# Patient Record
Sex: FEMALE | Race: OTHER | HISPANIC OR LATINO
[De-identification: names, ages, dates, MRNs, and addresses within clinical notes are randomized per-mention and may not be internally consistent; named-entity substitution may affect disease eponyms.]

---

## 2019-05-22 PROBLEM — Z00.00 ENCOUNTER FOR PREVENTIVE HEALTH EXAMINATION: Status: ACTIVE | Noted: 2019-05-22

## 2019-05-30 ENCOUNTER — APPOINTMENT (OUTPATIENT)
Dept: SPINE | Facility: CLINIC | Age: 59
End: 2019-05-30
Payer: COMMERCIAL

## 2019-05-30 VITALS
BODY MASS INDEX: 29.88 KG/M2 | HEIGHT: 64 IN | DIASTOLIC BLOOD PRESSURE: 76 MMHG | WEIGHT: 175 LBS | SYSTOLIC BLOOD PRESSURE: 120 MMHG

## 2019-05-30 PROCEDURE — 99203 OFFICE O/P NEW LOW 30 MIN: CPT

## 2019-05-30 NOTE — HISTORY OF PRESENT ILLNESS
[> 3 months] : more  than 3 months [FreeTextEntry1] : pain and numbness in the legs when standing or walking [de-identified] : This 58-year-old female who has been describing 10 months of pain and numbness in her legs and feet when she stands or walks. She is pain free when sitting or laying down. She can barely even walk one block before the pain starts. She has not seen any response to various medications. She does not smoke. MRI of the lumbar spine shows severe lumbar spinal stenosis at L3-4 and L4-5.

## 2019-05-30 NOTE — ASSESSMENT
[FreeTextEntry1] : Lumbar spinal claudication secondary to severe stenosis at L3-4 and L4-5. I've discussed further options with her specifically have discussed at L3-4 and L4-5 decompressive laminectomy and in situ fusion (no instrumentation) including all relative risks, benefits and alternatives. A CAT scan will be arranged prior to the surgery.

## 2019-05-30 NOTE — REVIEW OF SYSTEMS
[Feeling Tired] : feeling tired [Leg Weakness] : leg weakness [Anxiety] : anxiety [As Noted in HPI] : as noted in HPI [Eyesight Problems] : eyesight problems [Arthralgias] : arthralgias [Joint Pain] : joint pain [Negative] : Heme/Lymph [FreeTextEntry3] : blurry vision and intolerance to light [FreeTextEntry9] : weakness

## 2019-05-30 NOTE — PHYSICAL EXAM
[Person] : oriented to person [Place] : oriented to place [Time] : oriented to time [Short Term Intact] : short term memory intact [Remote Intact] : remote memory intact [Span Intact] : the attention span was normal [Concentration Intact] : normal concentrating ability [Fluency] : fluency intact [Comprehension] : comprehension intact [Current Events] : adequate knowledge of current events [Past History] : adequate knowledge of personal past history [Vocabulary] : adequate range of vocabulary [Cranial Nerves Optic (II)] : visual acuity intact bilaterally,  pupils equal round and reactive to light [Cranial Nerves Oculomotor (III)] : extraocular motion intact [Cranial Nerves Trigeminal (V)] : facial sensation intact symmetrically [Cranial Nerves Facial (VII)] : face symmetrical [Cranial Nerves Vestibulocochlear (VIII)] : hearing was intact bilaterally [Cranial Nerves Glossopharyngeal (IX)] : tongue and palate midline [Cranial Nerves Accessory (XI - Cranial And Spinal)] : head turning and shoulder shrug symmetric [Cranial Nerves Hypoglossal (XII)] : there was no tongue deviation with protrusion [Motor Tone] : muscle tone was normal in all four extremities [Motor Strength] : muscle strength was normal in all four extremities [No Muscle Atrophy] : normal bulk in all four extremities [Sensation Tactile Decrease] : light touch was intact [Abnormal Walk] : normal gait [Balance] : balance was intact [2+] : Ankle jerk left 2+ [No Tenderness to Palpation] : no spine tenderness on palpation [Full Pulse] : the pedal pulses are present [Past-pointing] : there was no past-pointing [Tremor] : no tremor present [Plantar Reflex Right Only] : normal on the right [Plantar Reflex Left Only] : normal on the left [Washburn] : Washburn's sign was not demonstrated [Straight-Leg Raise Test - Left] : straight leg raise of the left leg was negative [Straight-Leg Raise Test - Right] : straight leg raise  of the right leg was negative

## 2019-05-30 NOTE — CONSULT LETTER
[Dear  ___] : Dear  [unfilled], [Consult Letter:] : I had the pleasure of evaluating your patient, [unfilled]. [Please see my note below.] : Please see my note below. [Consult Closing:] : Thank you very much for allowing me to participate in the care of this patient.  If you have any questions, please do not hesitate to contact me. [Sincerely,] : Sincerely, [FreeTextEntry3] : Shashank Tiwari M.D.

## 2019-05-30 NOTE — REASON FOR VISIT
[New Patient Visit] : a new patient visit [Referred By: _________] : Patient was referred by EDWIN [Family Member] : family member [FreeTextEntry1] : The patient is a 58 year old woman who has been experiencing back pain and burning and numbness in both legs for 10 months.

## 2019-06-18 ENCOUNTER — OUTPATIENT (OUTPATIENT)
Dept: OUTPATIENT SERVICES | Facility: HOSPITAL | Age: 59
LOS: 1 days | End: 2019-06-18
Payer: COMMERCIAL

## 2019-06-18 VITALS
HEART RATE: 62 BPM | OXYGEN SATURATION: 99 % | SYSTOLIC BLOOD PRESSURE: 121 MMHG | WEIGHT: 175.05 LBS | DIASTOLIC BLOOD PRESSURE: 82 MMHG | HEIGHT: 64 IN | TEMPERATURE: 98 F | RESPIRATION RATE: 18 BRPM

## 2019-06-18 DIAGNOSIS — M48.061 SPINAL STENOSIS, LUMBAR REGION WITHOUT NEUROGENIC CLAUDICATION: ICD-10-CM

## 2019-06-18 DIAGNOSIS — Z87.42 PERSONAL HISTORY OF OTHER DISEASES OF THE FEMALE GENITAL TRACT: Chronic | ICD-10-CM

## 2019-06-18 DIAGNOSIS — M48.03 SPINAL STENOSIS, CERVICOTHORACIC REGION: ICD-10-CM

## 2019-06-18 DIAGNOSIS — Z29.9 ENCOUNTER FOR PROPHYLACTIC MEASURES, UNSPECIFIED: ICD-10-CM

## 2019-06-18 DIAGNOSIS — Z98.890 OTHER SPECIFIED POSTPROCEDURAL STATES: Chronic | ICD-10-CM

## 2019-06-18 LAB
BLD GP AB SCN SERPL QL: NEGATIVE — SIGNIFICANT CHANGE UP
RH IG SCN BLD-IMP: POSITIVE — SIGNIFICANT CHANGE UP

## 2019-06-18 PROCEDURE — 86850 RBC ANTIBODY SCREEN: CPT

## 2019-06-18 PROCEDURE — 87641 MR-STAPH DNA AMP PROBE: CPT

## 2019-06-18 PROCEDURE — 85027 COMPLETE CBC AUTOMATED: CPT

## 2019-06-18 PROCEDURE — 87640 STAPH A DNA AMP PROBE: CPT

## 2019-06-18 PROCEDURE — 86901 BLOOD TYPING SEROLOGIC RH(D): CPT

## 2019-06-18 PROCEDURE — G0463: CPT

## 2019-06-18 PROCEDURE — 86900 BLOOD TYPING SEROLOGIC ABO: CPT

## 2019-06-18 RX ORDER — CEFAZOLIN SODIUM 1 G
2000 VIAL (EA) INJECTION ONCE
Refills: 0 | Status: DISCONTINUED | OUTPATIENT
Start: 2019-06-25 | End: 2019-06-30

## 2019-06-18 NOTE — H&P PST ADULT - NSICDXPASTMEDICALHX_GEN_ALL_CORE_FT
PAST MEDICAL HISTORY:  PVD (peripheral vascular disease)     Spinal stenosis of lumbar region     Uterine prolapse sx >25 yrs ago

## 2019-06-18 NOTE — H&P PST ADULT - HISTORY OF PRESENT ILLNESS
59 yr old Somali speaking (poor historian) pt presents to [facility] accompanied by her daughter for a L3-5 Decompression Lumbar Laminectomy Insitu Fusion on 6/25/2019. PMH: PVD (as per daughter "had some in-office procedure on her legs by a vascular doctor") and lumbar spinal stenosis. Pt w/ progressive pain from her lumbar spine down her right leg making it difficult to ambulate which is affecting her activity of daily living. Denies chest pain or SOB and feels well otherwise.

## 2019-06-18 NOTE — H&P PST ADULT - NSICDXPASTSURGICALHX_GEN_ALL_CORE_FT
PAST SURGICAL HISTORY:  History of uterine prolapse s/p sx >25 yrs ago    Status post laser ablation of incompetent vein B/L legs 2/19'

## 2019-06-18 NOTE — H&P PST ADULT - NSICDXPROBLEM_GEN_ALL_CORE_FT
PROBLEM DIAGNOSES  Problem: Spinal stenosis of lumbar region  Assessment and Plan: Pt scheduled for sx on 6/25/2019. Surgical and chlorhexidine instructions reviewed w/ pt and daughter.     Problem: Need for prophylactic measure  Assessment and Plan: The Caprini score indicates this patient is at risk for a VTE event (score 3-5).  Most surgical patients in this group would benefit from pharmacologic prophylaxis.  The surgical team will determine the balance between VTE risk and bleeding risk

## 2019-06-19 LAB
HCT VFR BLD CALC: 44.3 % — SIGNIFICANT CHANGE UP (ref 34.5–45)
HGB BLD-MCNC: 14.1 G/DL — SIGNIFICANT CHANGE UP (ref 11.5–15.5)
MCHC RBC-ENTMCNC: 29.3 PG — SIGNIFICANT CHANGE UP (ref 27–34)
MCHC RBC-ENTMCNC: 31.8 GM/DL — LOW (ref 32–36)
MCV RBC AUTO: 92.1 FL — SIGNIFICANT CHANGE UP (ref 80–100)
MRSA PCR RESULT.: SIGNIFICANT CHANGE UP
PLATELET # BLD AUTO: 205 K/UL — SIGNIFICANT CHANGE UP (ref 150–400)
RBC # BLD: 4.81 M/UL — SIGNIFICANT CHANGE UP (ref 3.8–5.2)
RBC # FLD: 13.3 % — SIGNIFICANT CHANGE UP (ref 10.3–14.5)
S AUREUS DNA NOSE QL NAA+PROBE: SIGNIFICANT CHANGE UP
WBC # BLD: 6.15 K/UL — SIGNIFICANT CHANGE UP (ref 3.8–10.5)
WBC # FLD AUTO: 6.15 K/UL — SIGNIFICANT CHANGE UP (ref 3.8–10.5)

## 2019-06-24 ENCOUNTER — TRANSCRIPTION ENCOUNTER (OUTPATIENT)
Age: 59
End: 2019-06-24

## 2019-06-24 VITALS
SYSTOLIC BLOOD PRESSURE: 118 MMHG | OXYGEN SATURATION: 100 % | DIASTOLIC BLOOD PRESSURE: 78 MMHG | WEIGHT: 175.05 LBS | HEIGHT: 64 IN | HEART RATE: 60 BPM

## 2019-06-24 NOTE — PRE-ANESTHESIA EVALUATION ADULT - NSANTHPMHFT_GEN_ALL_CORE
58 y/o F, hx of PVD with lumbar spinal stenosis with progressive pain down right leg presenting for decompressive lumbar laminectomy. 58 y/o F, hx of PVD with lumbar spinal stenosis with progressive pain down right leg presenting for decompressive lumbar laminectomy.  Denies CP, SOB, hx of TIA/stroke. no weakness.  MET>4. + numbness in RLE. denies weakness

## 2019-06-25 ENCOUNTER — APPOINTMENT (OUTPATIENT)
Dept: SPINE | Facility: HOSPITAL | Age: 59
End: 2019-06-25

## 2019-06-25 ENCOUNTER — INPATIENT (INPATIENT)
Facility: HOSPITAL | Age: 59
LOS: 4 days | Discharge: HOME | DRG: 460 | End: 2019-06-30
Attending: NEUROLOGICAL SURGERY | Admitting: NEUROLOGICAL SURGERY
Payer: COMMERCIAL

## 2019-06-25 VITALS
OXYGEN SATURATION: 98 % | HEIGHT: 64 IN | TEMPERATURE: 97 F | SYSTOLIC BLOOD PRESSURE: 118 MMHG | HEART RATE: 60 BPM | WEIGHT: 175.05 LBS | DIASTOLIC BLOOD PRESSURE: 78 MMHG | RESPIRATION RATE: 18 BRPM

## 2019-06-25 DIAGNOSIS — Z98.890 OTHER SPECIFIED POSTPROCEDURAL STATES: Chronic | ICD-10-CM

## 2019-06-25 DIAGNOSIS — Z87.42 PERSONAL HISTORY OF OTHER DISEASES OF THE FEMALE GENITAL TRACT: Chronic | ICD-10-CM

## 2019-06-25 DIAGNOSIS — M48.03 SPINAL STENOSIS, CERVICOTHORACIC REGION: ICD-10-CM

## 2019-06-25 LAB — RH IG SCN BLD-IMP: POSITIVE — SIGNIFICANT CHANGE UP

## 2019-06-25 PROCEDURE — 72020 X-RAY EXAM OF SPINE 1 VIEW: CPT | Mod: 26

## 2019-06-25 PROCEDURE — 22614 ARTHRD PST TQ 1NTRSPC EA ADD: CPT | Mod: GC

## 2019-06-25 PROCEDURE — 63048 LAM FACETEC &FORAMOT EA ADDL: CPT | Mod: GC

## 2019-06-25 PROCEDURE — 63047 LAM FACETEC & FORAMOT LUMBAR: CPT | Mod: GC

## 2019-06-25 PROCEDURE — 22612 ARTHRD PST TQ 1NTRSPC LUMBAR: CPT | Mod: GC

## 2019-06-25 RX ORDER — DIAZEPAM 5 MG
5 TABLET ORAL EVERY 8 HOURS
Refills: 0 | Status: DISCONTINUED | OUTPATIENT
Start: 2019-06-25 | End: 2019-06-30

## 2019-06-25 RX ORDER — SODIUM CHLORIDE 9 MG/ML
3 INJECTION INTRAMUSCULAR; INTRAVENOUS; SUBCUTANEOUS EVERY 8 HOURS
Refills: 0 | Status: DISCONTINUED | OUTPATIENT
Start: 2019-06-25 | End: 2019-06-25

## 2019-06-25 RX ORDER — ENOXAPARIN SODIUM 100 MG/ML
40 INJECTION SUBCUTANEOUS AT BEDTIME
Refills: 0 | Status: DISCONTINUED | OUTPATIENT
Start: 2019-06-26 | End: 2019-06-30

## 2019-06-25 RX ORDER — SENNA PLUS 8.6 MG/1
2 TABLET ORAL AT BEDTIME
Refills: 0 | Status: DISCONTINUED | OUTPATIENT
Start: 2019-06-25 | End: 2019-06-30

## 2019-06-25 RX ORDER — HYDROMORPHONE HYDROCHLORIDE 2 MG/ML
0.25 INJECTION INTRAMUSCULAR; INTRAVENOUS; SUBCUTANEOUS
Refills: 0 | Status: DISCONTINUED | OUTPATIENT
Start: 2019-06-25 | End: 2019-06-25

## 2019-06-25 RX ORDER — CHLORHEXIDINE GLUCONATE 213 G/1000ML
1 SOLUTION TOPICAL ONCE
Refills: 0 | Status: COMPLETED | OUTPATIENT
Start: 2019-06-25 | End: 2019-06-25

## 2019-06-25 RX ORDER — OXYCODONE HYDROCHLORIDE 5 MG/1
5 TABLET ORAL EVERY 4 HOURS
Refills: 0 | Status: DISCONTINUED | OUTPATIENT
Start: 2019-06-25 | End: 2019-06-30

## 2019-06-25 RX ORDER — ACETAMINOPHEN 500 MG
650 TABLET ORAL EVERY 6 HOURS
Refills: 0 | Status: DISCONTINUED | OUTPATIENT
Start: 2019-06-25 | End: 2019-06-30

## 2019-06-25 RX ORDER — OXYCODONE HYDROCHLORIDE 5 MG/1
10 TABLET ORAL EVERY 4 HOURS
Refills: 0 | Status: DISCONTINUED | OUTPATIENT
Start: 2019-06-25 | End: 2019-06-30

## 2019-06-25 RX ORDER — CEFAZOLIN SODIUM 1 G
2000 VIAL (EA) INJECTION EVERY 8 HOURS
Refills: 0 | Status: COMPLETED | OUTPATIENT
Start: 2019-06-25 | End: 2019-06-25

## 2019-06-25 RX ORDER — DEXTROSE MONOHYDRATE, SODIUM CHLORIDE, AND POTASSIUM CHLORIDE 50; .745; 4.5 G/1000ML; G/1000ML; G/1000ML
1000 INJECTION, SOLUTION INTRAVENOUS
Refills: 0 | Status: DISCONTINUED | OUTPATIENT
Start: 2019-06-25 | End: 2019-06-30

## 2019-06-25 RX ORDER — LIDOCAINE HCL 20 MG/ML
0.2 VIAL (ML) INJECTION ONCE
Refills: 0 | Status: DISCONTINUED | OUTPATIENT
Start: 2019-06-25 | End: 2019-06-25

## 2019-06-25 RX ORDER — DOCUSATE SODIUM 100 MG
100 CAPSULE ORAL THREE TIMES A DAY
Refills: 0 | Status: DISCONTINUED | OUTPATIENT
Start: 2019-06-25 | End: 2019-06-30

## 2019-06-25 RX ORDER — HYDROMORPHONE HYDROCHLORIDE 2 MG/ML
0.5 INJECTION INTRAMUSCULAR; INTRAVENOUS; SUBCUTANEOUS
Refills: 0 | Status: DISCONTINUED | OUTPATIENT
Start: 2019-06-25 | End: 2019-06-25

## 2019-06-25 RX ADMIN — Medication 1 TABLET(S): at 16:22

## 2019-06-25 RX ADMIN — SENNA PLUS 2 TABLET(S): 8.6 TABLET ORAL at 21:42

## 2019-06-25 RX ADMIN — CHLORHEXIDINE GLUCONATE 1 APPLICATION(S): 213 SOLUTION TOPICAL at 07:52

## 2019-06-25 RX ADMIN — Medication 100 MILLIGRAM(S): at 23:49

## 2019-06-25 RX ADMIN — HYDROMORPHONE HYDROCHLORIDE 0.5 MILLIGRAM(S): 2 INJECTION INTRAMUSCULAR; INTRAVENOUS; SUBCUTANEOUS at 13:30

## 2019-06-25 RX ADMIN — OXYCODONE HYDROCHLORIDE 5 MILLIGRAM(S): 5 TABLET ORAL at 18:10

## 2019-06-25 RX ADMIN — HYDROMORPHONE HYDROCHLORIDE 0.5 MILLIGRAM(S): 2 INJECTION INTRAMUSCULAR; INTRAVENOUS; SUBCUTANEOUS at 13:15

## 2019-06-25 RX ADMIN — Medication 1 TABLET(S): at 21:42

## 2019-06-25 RX ADMIN — Medication 100 MILLIGRAM(S): at 16:22

## 2019-06-25 RX ADMIN — Medication 100 MILLIGRAM(S): at 21:41

## 2019-06-25 RX ADMIN — SODIUM CHLORIDE 3 MILLILITER(S): 9 INJECTION INTRAMUSCULAR; INTRAVENOUS; SUBCUTANEOUS at 07:53

## 2019-06-25 RX ADMIN — DEXTROSE MONOHYDRATE, SODIUM CHLORIDE, AND POTASSIUM CHLORIDE 75 MILLILITER(S): 50; .745; 4.5 INJECTION, SOLUTION INTRAVENOUS at 12:41

## 2019-06-25 RX ADMIN — OXYCODONE HYDROCHLORIDE 5 MILLIGRAM(S): 5 TABLET ORAL at 18:40

## 2019-06-25 NOTE — PHYSICAL THERAPY INITIAL EVALUATION ADULT - ADDITIONAL COMMENTS
Pt reports she lives with her daughter and granddaughter in a first floor apartment with 5 steps to enter +rail. Pt was independent with all mobility prior to admit.

## 2019-06-25 NOTE — PHYSICAL THERAPY INITIAL EVALUATION ADULT - PERTINENT HX OF CURRENT PROBLEM, REHAB EVAL
60y/o Lithuanian speaking (poor historian) pt presents to [facility] accompanied by her daughter for a L3-5 Decompression Lumbar Laminectomy Insitu Fusion on 6/25/2019. PMH: PVD (as per daughter "had some in-office procedure on her legs by a vascular doctor") and lumbar spinal stenosis.

## 2019-06-25 NOTE — PHYSICAL THERAPY INITIAL EVALUATION ADULT - PLANNED THERAPY INTERVENTIONS, PT EVAL
Stair training... GOAL: In 2 weeks pt will negotiate 1 flight of stairs independently with least restrictive device./balance training/transfer training/gait training/strengthening/bed mobility training

## 2019-06-25 NOTE — PHYSICAL THERAPY INITIAL EVALUATION ADULT - GAIT DEVIATIONS NOTED, PT EVAL
decreased swing-to-stance ratio/decreased weight-shifting ability/decreased velocity of limb motion/increased time in double stance/decreased step length/decreased harleen/decreased stride length

## 2019-06-25 NOTE — PHYSICAL THERAPY INITIAL EVALUATION ADULT - GENERAL OBSERVATIONS, REHAB EVAL
Pt tolerated 45min PT initial evaluation fairly well. Pt rec'd in bed in NAD, Vincentian speaking, family bedside interpreting as needed.

## 2019-06-26 LAB
ANION GAP SERPL CALC-SCNC: 12 MMOL/L — SIGNIFICANT CHANGE UP (ref 5–17)
BASOPHILS # BLD AUTO: 0.01 K/UL — SIGNIFICANT CHANGE UP (ref 0–0.2)
BASOPHILS NFR BLD AUTO: 0.1 % — SIGNIFICANT CHANGE UP (ref 0–2)
BUN SERPL-MCNC: 13 MG/DL — SIGNIFICANT CHANGE UP (ref 7–23)
CALCIUM SERPL-MCNC: 9.3 MG/DL — SIGNIFICANT CHANGE UP (ref 8.4–10.5)
CHLORIDE SERPL-SCNC: 102 MMOL/L — SIGNIFICANT CHANGE UP (ref 96–108)
CO2 SERPL-SCNC: 24 MMOL/L — SIGNIFICANT CHANGE UP (ref 22–31)
CREAT SERPL-MCNC: 0.84 MG/DL — SIGNIFICANT CHANGE UP (ref 0.5–1.3)
EOSINOPHIL # BLD AUTO: 0 K/UL — SIGNIFICANT CHANGE UP (ref 0–0.5)
EOSINOPHIL NFR BLD AUTO: 0 % — SIGNIFICANT CHANGE UP (ref 0–6)
GLUCOSE SERPL-MCNC: 119 MG/DL — HIGH (ref 70–99)
HCT VFR BLD CALC: 39.8 % — SIGNIFICANT CHANGE UP (ref 34.5–45)
HGB BLD-MCNC: 12.9 G/DL — SIGNIFICANT CHANGE UP (ref 11.5–15.5)
IMM GRANULOCYTES NFR BLD AUTO: 0.6 % — SIGNIFICANT CHANGE UP (ref 0–1.5)
LYMPHOCYTES # BLD AUTO: 1.46 K/UL — SIGNIFICANT CHANGE UP (ref 1–3.3)
LYMPHOCYTES # BLD AUTO: 10.6 % — LOW (ref 13–44)
MCHC RBC-ENTMCNC: 29.9 PG — SIGNIFICANT CHANGE UP (ref 27–34)
MCHC RBC-ENTMCNC: 32.4 GM/DL — SIGNIFICANT CHANGE UP (ref 32–36)
MCV RBC AUTO: 92.3 FL — SIGNIFICANT CHANGE UP (ref 80–100)
MONOCYTES # BLD AUTO: 0.8 K/UL — SIGNIFICANT CHANGE UP (ref 0–0.9)
MONOCYTES NFR BLD AUTO: 5.8 % — SIGNIFICANT CHANGE UP (ref 2–14)
NEUTROPHILS # BLD AUTO: 11.48 K/UL — HIGH (ref 1.8–7.4)
NEUTROPHILS NFR BLD AUTO: 82.9 % — HIGH (ref 43–77)
PLATELET # BLD AUTO: 207 K/UL — SIGNIFICANT CHANGE UP (ref 150–400)
POTASSIUM SERPL-MCNC: 4.8 MMOL/L — SIGNIFICANT CHANGE UP (ref 3.5–5.3)
POTASSIUM SERPL-SCNC: 4.8 MMOL/L — SIGNIFICANT CHANGE UP (ref 3.5–5.3)
RBC # BLD: 4.31 M/UL — SIGNIFICANT CHANGE UP (ref 3.8–5.2)
RBC # FLD: 13.4 % — SIGNIFICANT CHANGE UP (ref 10.3–14.5)
SODIUM SERPL-SCNC: 138 MMOL/L — SIGNIFICANT CHANGE UP (ref 135–145)
WBC # BLD: 13.83 K/UL — HIGH (ref 3.8–10.5)
WBC # FLD AUTO: 13.83 K/UL — HIGH (ref 3.8–10.5)

## 2019-06-26 PROCEDURE — 93970 EXTREMITY STUDY: CPT | Mod: 26

## 2019-06-26 RX ORDER — ACETAMINOPHEN 500 MG
1000 TABLET ORAL ONCE
Refills: 0 | Status: COMPLETED | OUTPATIENT
Start: 2019-06-26 | End: 2019-06-26

## 2019-06-26 RX ADMIN — Medication 100 MILLIGRAM(S): at 05:51

## 2019-06-26 RX ADMIN — Medication 1 TABLET(S): at 12:32

## 2019-06-26 RX ADMIN — ENOXAPARIN SODIUM 40 MILLIGRAM(S): 100 INJECTION SUBCUTANEOUS at 21:21

## 2019-06-26 RX ADMIN — OXYCODONE HYDROCHLORIDE 5 MILLIGRAM(S): 5 TABLET ORAL at 01:50

## 2019-06-26 RX ADMIN — Medication 650 MILLIGRAM(S): at 18:56

## 2019-06-26 RX ADMIN — OXYCODONE HYDROCHLORIDE 5 MILLIGRAM(S): 5 TABLET ORAL at 01:21

## 2019-06-26 RX ADMIN — OXYCODONE HYDROCHLORIDE 10 MILLIGRAM(S): 5 TABLET ORAL at 18:45

## 2019-06-26 RX ADMIN — DEXTROSE MONOHYDRATE, SODIUM CHLORIDE, AND POTASSIUM CHLORIDE 75 MILLILITER(S): 50; .745; 4.5 INJECTION, SOLUTION INTRAVENOUS at 21:41

## 2019-06-26 RX ADMIN — Medication 1000 MILLIGRAM(S): at 11:07

## 2019-06-26 RX ADMIN — SENNA PLUS 2 TABLET(S): 8.6 TABLET ORAL at 21:22

## 2019-06-26 RX ADMIN — Medication 400 MILLIGRAM(S): at 10:37

## 2019-06-26 RX ADMIN — Medication 100 MILLIGRAM(S): at 21:22

## 2019-06-26 RX ADMIN — Medication 100 MILLIGRAM(S): at 12:32

## 2019-06-26 RX ADMIN — DEXTROSE MONOHYDRATE, SODIUM CHLORIDE, AND POTASSIUM CHLORIDE 75 MILLILITER(S): 50; .745; 4.5 INJECTION, SOLUTION INTRAVENOUS at 05:51

## 2019-06-26 NOTE — CHART NOTE - NSCHARTNOTEFT_GEN_A_CORE
CAPRINI SCORE [CLOT] Score on Admission for     AGE RELATED RISK FACTORS                                                       MOBILITY RELATED FACTORS  [ ] Age 41-60 years                                            (1 Point)                  [ ] Bed rest                                                        (1 Point)  [ x] Age: 61-74 years                                           (2 Points)                 [ ] Plaster cast                                                   (2 Points)  [ ] Age= 75 years                                              (3 Points)                 [ ] Bed bound for more than 72 hours                 (2 Points)    DISEASE RELATED RISK FACTORS                                               GENDER SPECIFIC FACTORS  [ ] Edema in the lower extremities                       (1 Point)                  [ ] Pregnancy                                                     (1 Point)  [ ] Varicose veins                                               (1 Point)                  [ ] Post-partum < 6 weeks                                   (1 Point)             [ ] BMI > 25 Kg/m2                                            (1 Point)                  [ ] Hormonal therapy  or oral contraception          (1 Point)                 [ ] Sepsis (in the previous month)                        (1 Point)                  [ ] History of pregnancy complications                 (1 point)  [ ] Pneumonia or serious lung disease                                               [ ] Unexplained or recurrent                     (1 Point)           (in the previous month)                               (1 Point)  [ ] Abnormal pulmonary function test                     (1 Point)                 SURGERY RELATED RISK FACTORS (include planned surgeries)  [ ] Acute myocardial infarction                              (1 Point)                 [ ]  Section                                             (1 Point)  [ ] Congestive heart failure (in the previous month)  (1 Point)         [ ] Minor surgery                                                  (1 Point)   [ ] Inflammatory bowel disease                             (1 Point)                 [ ] Arthroscopic surgery                                        (2 Points)  [ ] Central venous access                                      (2 Points)                [x ] General surgery lasting more than 45 minutes   (2 Points)       [ ] Stroke (in the previous month)                          (5 Points)               [ ] Elective arthroplasty                                         (5 Points)            [ ] current or past malignancy                              (2 Points)                                                                                                       HEMATOLOGY RELATED FACTORS                                                 TRAUMA RELATED RISK FACTORS  [ ] Prior episodes of VTE                                     (3 Points)                [ ] Fracture of the hip, pelvis, or leg                       (5 Points)  [ ] Positive family history for VTE                         (3 Points)                 [ ] Acute spinal cord injury (in the previous month)  (5 Points)  [ ] Prothrombin 71503 A                                     (3 Points)                 [ ] Paralysis  (less than 1 month)                             (5 Points)  [ ] Factor V Leiden                                             (3 Points)                  [ ] Multiple Trauma within 1 month                        (5 Points)  [ ] Lupus anticoagulants                                     (3 Points)                                                           [ ] Anticardiolipin antibodies                               (3 Points)                                                       [ ] High homocysteine in the blood                      (3 Points)                                             [ ] Other congenital or acquired thrombophilia      (3 Points)                                                [ ] Heparin induced thrombocytopenia                  (3 Points)                                          Total Score [     4     ]    Risk:  Very low 0   Low 1 to 2   Moderate 3 to 4   High =5       VTE Prophylasix Recommednations:  [x ] mechanical pneumatic compression devices                                      [ ] contraindicated: _____________________  [x ] chemo prophylasix                                                                                   [ ] contraindicated _____________________    **** HIGH LIKELIHOOD DVT PRESENT ON ADMISSION  [ ] (please order LE dopplers within 24 hours of admission)

## 2019-06-26 NOTE — PROGRESS NOTE ADULT - SUBJECTIVE AND OBJECTIVE BOX
SUBJECTIVE: Patient seen and examined at bedside with family earlier today. Complains of incisional pain. Was flat until noon.     OVERNIGHT EVENTS: none     Vital Signs Last 24 Hrs  T(C): 36.9 (26 Jun 2019 12:05), Max: 36.9 (26 Jun 2019 12:05)  T(F): 98.5 (26 Jun 2019 12:05), Max: 98.5 (26 Jun 2019 12:05)  HR: 65 (26 Jun 2019 12:05) (63 - 81)  BP: 100/54 (26 Jun 2019 12:05) (98/68 - 113/70)  BP(mean): --  RR: 18 (26 Jun 2019 12:05) (16 - 20)  SpO2: 99% (26 Jun 2019 12:05) (94% - 100%)    PHYSICAL EXAM:    General: No Acute Distress     Neurological: Awake, alert oriented to person, place and time, Following Commands, PERRL, EOMI, Face Symmetrical, Speech Fluent, Moving all extremities, Muscle Strength normal in all four extremities, No Drift, Sensation to Light Touch Intact    Pulmonary: Clear to Auscultation, No Rales, No Rhonchi, No Wheezes     Cardiovascular: S1, S2, Regular Rate and Rhythm     Gastrointestinal: Soft, Nontender, Nondistended     Incision: c/d/i     LABS:                        12.9   13.83 )-----------( 207      ( 26 Jun 2019 09:17 )             39.8    06-26    138  |  102  |  13  ----------------------------<  119<H>  4.8   |  24  |  0.84    Ca    9.3      26 Jun 2019 07:19      DIET: regular diet      IMAGING: screening LE dopplers pending

## 2019-06-26 NOTE — PROGRESS NOTE ADULT - ASSESSMENT
59 yr old Tuvaluan speaking (poor historian) pt presents to [facility] accompanied by her daughter for a L3-5 Decompression Lumbar Laminectomy Insitu Fusion on 6/25/2019. PMH: PVD (as per daughter "had some in-office procedure on her legs by a vascular doctor") and lumbar spinal stenosis. Pt w/ progressive pain from her lumbar spine down her right leg making it difficult to ambulate which is affecting her activity of daily living. Denies chest pain or SOB and feels well otherwise. (18 Jun 2019 16:51)    PROCEDURE:  6/25 s/p L3-5 lami with in situ fusion CSF leak at L5-S1 primarily repaired  POD#1    PLAN:  -Neuro: flat until noon today  -Oxy IR for pain control. Valium for muscle spasms   -Encouraged incentive spirometry   -Colace, senna for bowel regimen  -Continue oscal and multivitamin  -Regular diet   -DVT ppx: venodynes and sql  -PT: pending      Will discuss above with Dr. Te Cleaning #[id number]     Assessment:  Please Check When Present   []  GCS  E   V  M     Heart Failure: []Acute, [] acute on chronic , []chronic  Heart Failure:  [] Diastolic (HFpEF), [] Systolic (HFrEF), []Combined (HFpEF and HFrEF), [] RHF, [] Pulm HTN, [] Other    [] CHRISTI, [] ATN, [] AIN, [] other  [] CKD1, [] CKD2, [] CKD 3, [] CKD 4, [] CKD 5, []ESRD    Encephalopathy: [] Metabolic, [] Hepatic, [] toxic, [] Neurological, [] Other    Abnormal Nurtitional Status: [] malnurtition (see nutrition note), [ ]underweight: BMI < 19, [] morbid obesity: BMI >40, [] Cachexia    [] Sepsis  [] hypovolemic shock,[] cardiogenic shock, [] hemorrhagic shock, [] neuogenic shock  [] Acute Respiratory Failure  []Cerebral edema, [] Brain compression/ herniation,   [] Functional quadriplegia  [] Acute blood loss anemia

## 2019-06-27 PROCEDURE — 99223 1ST HOSP IP/OBS HIGH 75: CPT

## 2019-06-27 RX ORDER — POLYETHYLENE GLYCOL 3350 17 G/17G
17 POWDER, FOR SOLUTION ORAL DAILY
Refills: 0 | Status: DISCONTINUED | OUTPATIENT
Start: 2019-06-27 | End: 2019-06-30

## 2019-06-27 RX ADMIN — OXYCODONE HYDROCHLORIDE 10 MILLIGRAM(S): 5 TABLET ORAL at 17:50

## 2019-06-27 RX ADMIN — OXYCODONE HYDROCHLORIDE 10 MILLIGRAM(S): 5 TABLET ORAL at 08:03

## 2019-06-27 RX ADMIN — SENNA PLUS 2 TABLET(S): 8.6 TABLET ORAL at 22:49

## 2019-06-27 RX ADMIN — Medication 100 MILLIGRAM(S): at 05:30

## 2019-06-27 RX ADMIN — OXYCODONE HYDROCHLORIDE 10 MILLIGRAM(S): 5 TABLET ORAL at 18:50

## 2019-06-27 RX ADMIN — DEXTROSE MONOHYDRATE, SODIUM CHLORIDE, AND POTASSIUM CHLORIDE 75 MILLILITER(S): 50; .745; 4.5 INJECTION, SOLUTION INTRAVENOUS at 14:03

## 2019-06-27 RX ADMIN — POLYETHYLENE GLYCOL 3350 17 GRAM(S): 17 POWDER, FOR SOLUTION ORAL at 14:03

## 2019-06-27 RX ADMIN — Medication 1 TABLET(S): at 14:03

## 2019-06-27 RX ADMIN — Medication 100 MILLIGRAM(S): at 22:50

## 2019-06-27 RX ADMIN — OXYCODONE HYDROCHLORIDE 10 MILLIGRAM(S): 5 TABLET ORAL at 09:00

## 2019-06-27 RX ADMIN — Medication 100 MILLIGRAM(S): at 14:02

## 2019-06-27 RX ADMIN — Medication 10 MILLIGRAM(S): at 00:40

## 2019-06-27 RX ADMIN — ENOXAPARIN SODIUM 40 MILLIGRAM(S): 100 INJECTION SUBCUTANEOUS at 22:50

## 2019-06-27 RX ADMIN — Medication 1 TABLET(S): at 14:02

## 2019-06-27 NOTE — PROGRESS NOTE ADULT - SUBJECTIVE AND OBJECTIVE BOX
SUBJECTIVE: Patient seen and examined at bedside with family earlier today. Complains of incisional pain.      OVERNIGHT EVENTS: Had to be straight cath overnight. Was orthostatic with PT yesterday.     Vital Signs Last 24 Hrs  T(C): 37.2 (27 Jun 2019 09:44), Max: 37.6 (26 Jun 2019 21:21)  T(F): 98.9 (27 Jun 2019 09:44), Max: 99.6 (26 Jun 2019 21:21)  HR: 70 (27 Jun 2019 09:44) (57 - 83)  BP: 123/70 (27 Jun 2019 09:44) (104/67 - 126/69)  BP(mean): --  RR: 18 (27 Jun 2019 09:44) (17 - 19)  SpO2: 96% (27 Jun 2019 09:44) (93% - 96%)    PHYSICAL EXAM:    General: No Acute Distress     Neurological: Awake, alert oriented to person, place and time, Following Commands, PERRL, EOMI, Face Symmetrical, Speech Fluent, Moving all extremities, Muscle Strength normal in all four extremities, No Drift, Sensation to Light Touch Intact    Pulmonary: Clear to Auscultation, No Rales, No Rhonchi, No Wheezes     Cardiovascular: S1, S2, Regular Rate and Rhythm     Gastrointestinal: Soft, Nontender, Nondistended     Incision: c/d/i     LABS: no new labs       DIET: regular diet      IMAGING: < from: [facility] Duplex Lower Ext Vein Scan, Bilat (06.26.19 @ 18:04) >  No evidence of deep venous thrombosis in either lower extremity.    < end of copied text >

## 2019-06-27 NOTE — PROGRESS NOTE ADULT - ASSESSMENT
59 yr old [place] citizen speaking (poor historian) pt presents to [facility] accompanied by her daughter for a L3-5 Decompression Lumbar Laminectomy Insitu Fusion on 6/25/2019. PMH: PVD (as per daughter "had some in-office procedure on her legs by a vascular doctor") and lumbar spinal stenosis. Pt w/ progressive pain from her lumbar spine down her right leg making it difficult to ambulate which is affecting her activity of daily living. Denies chest pain or SOB and feels well otherwise. (18 Jun 2019 16:51)    PROCEDURE:  6/25 s/p L3-5 lami with in situ fusion CSF leak at L5-S1 primarily repaired  POD#2    PLAN:  -Neuro: neuro stable   -Hospitalist called for orthostatic hypotension. Follow up appreciated   -Oxy IR for pain control. Valium for muscle spasms   -Encouraged incentive spirometry   -Colace, senna for bowel regimen  -Continue oscal and multivitamin  -Regular diet   -DVT ppx: venodynes and sql  -PT: tbd    Will discuss above with Dr. Te Cleaning #[id number]    Assessment:  Please Check When Present   []  GCS  E   V  M     Heart Failure: []Acute, [] acute on chronic , []chronic  Heart Failure:  [] Diastolic (HFpEF), [] Systolic (HFrEF), []Combined (HFpEF and HFrEF), [] RHF, [] Pulm HTN, [] Other    [] CHRISTI, [] ATN, [] AIN, [] other  [] CKD1, [] CKD2, [] CKD 3, [] CKD 4, [] CKD 5, []ESRD    Encephalopathy: [] Metabolic, [] Hepatic, [] toxic, [] Neurological, [] Other    Abnormal Nurtitional Status: [] malnurtition (see nutrition note), [ ]underweight: BMI < 19, [] morbid obesity: BMI >40, [] Cachexia    [] Sepsis  [] hypovolemic shock,[] cardiogenic shock, [] hemorrhagic shock, [] neuogenic shock  [] Acute Respiratory Failure  []Cerebral edema, [] Brain compression/ herniation,   [] Functional quadriplegia  [] Acute blood loss anemia

## 2019-06-27 NOTE — CONSULT NOTE ADULT - SUBJECTIVE AND OBJECTIVE BOX
Patient is a 59y old  Female who presents with a chief complaint of "Here for surgery on my back" (18 Jun 2019 16:51)    HPI: 59 yr old Yi speaking pt s/p L3-5 lami with in situ fusion on 6/25, CSF leak at L5-S1 primarily repaired.     Orthostatic with PT yesterday. Has not gotten out of bed today. Borjas removed last night, unable to urinate since, requiring straight cath.     Currently in bed, c/o mild suprapubic pain. Translation provided by daughter at bedside.     PMH/PSH/FH/SH:    Past Medical, Past Surgical, and Family History:  PAST MEDICAL HISTORY:  PVD (peripheral vascular disease)     Spinal stenosis of lumbar region     Uterine prolapse sx >25 yrs ago.     PAST SURGICAL HISTORY:  History of uterine prolapse s/p sx >25 yrs ago    Status post laser ablation of incompetent vein B/L legs 2/19'.     FAMILY HISTORY:  No pertinent family history in first degree relatives.     No Pertinent Family History in first degree relatives of: Father & Mother.    Allergies- None    Social history- Lives w family. No substance abuse.     06-26    138  |  102  |  13  ----------------------------<  119<H>  4.8   |  24  |  0.84    Ca    9.3      26 Jun 2019 07:19                            12.9   13.83 )-----------( 207      ( 26 Jun 2019 09:17 )             39.8     MEDICATIONS  (STANDING):  calcium carbonate 1250 mG  + Vitamin D (OsCal 500 + D) 1 Tablet(s) Oral daily  ceFAZolin   IVPB 2000 milliGRAM(s) IV Intermittent once  docusate sodium 100 milliGRAM(s) Oral three times a day  enoxaparin Injectable 40 milliGRAM(s) SubCutaneous at bedtime  multivitamin 1 Tablet(s) Oral daily  polyethylene glycol 3350 17 Gram(s) Oral daily  senna 2 Tablet(s) Oral at bedtime  sodium chloride 0.9% with potassium chloride 20 mEq/L 1000 milliLiter(s) (75 mL/Hr) IV Continuous <Continuous>    MEDICATIONS  (PRN):  acetaminophen   Tablet .. 650 milliGRAM(s) Oral every 6 hours PRN Temp greater or equal to 38C (100.4F), Mild Pain (1 - 3)  bisacodyl Suppository 10 milliGRAM(s) Rectal daily PRN Constipation  diazepam    Tablet 5 milliGRAM(s) Oral every 8 hours PRN spasm  oxyCODONE    IR 5 milliGRAM(s) Oral every 4 hours PRN Moderate Pain (4 - 6)  oxyCODONE    IR 10 milliGRAM(s) Oral every 4 hours PRN Severe Pain (7 - 10)    Home Medications:  Calcium 500+D oral tablet, chewable: 1 tab(s) orally once a day (25 Jun 2019 07:35)  Multiple Vitamins oral tablet: 1 tab(s) orally once a day (25 Jun 2019 07:35)      Vital Signs Last 24 Hrs  T(C): 37.4 (27 Jun 2019 13:36), Max: 37.6 (26 Jun 2019 21:21)  T(F): 99.4 (27 Jun 2019 13:36), Max: 99.6 (26 Jun 2019 21:21)  HR: 72 (27 Jun 2019 13:36) (57 - 83)  BP: 104/66 (27 Jun 2019 13:36) (104/66 - 126/69)  BP(mean): --  RR: 18 (27 Jun 2019 13:36) (17 - 19)  SpO2: 96% (27 Jun 2019 13:36) (93% - 96%)

## 2019-06-27 NOTE — CONSULT NOTE ADULT - ASSESSMENT
59 yr old Australian speaking pt s/p L3-5 lami with in situ fusion on 6/25, CSF leak at L5-S1 primarily repaired.     Orthostatic with PT yesterday. Borjas removed last night, unable to urinate since, requiring straight cath.     1. Orthostatic hypotension- On IV hydration overnight. Recheck orthostatics today.     2. Retention of urine- Currently getting straight cath. Alpha blockers not helpful for retention in females and may worsen orthostasis.     Attempt to urinate in bathroom or bedside commode if orthostasis has resolved.

## 2019-06-28 ENCOUNTER — TRANSCRIPTION ENCOUNTER (OUTPATIENT)
Age: 59
End: 2019-06-28

## 2019-06-28 LAB
ANION GAP SERPL CALC-SCNC: 9 MMOL/L — SIGNIFICANT CHANGE UP (ref 5–17)
BUN SERPL-MCNC: 11 MG/DL — SIGNIFICANT CHANGE UP (ref 7–23)
CALCIUM SERPL-MCNC: 8.4 MG/DL — SIGNIFICANT CHANGE UP (ref 8.4–10.5)
CHLORIDE SERPL-SCNC: 99 MMOL/L — SIGNIFICANT CHANGE UP (ref 96–108)
CO2 SERPL-SCNC: 26 MMOL/L — SIGNIFICANT CHANGE UP (ref 22–31)
CREAT SERPL-MCNC: 0.85 MG/DL — SIGNIFICANT CHANGE UP (ref 0.5–1.3)
GLUCOSE SERPL-MCNC: 122 MG/DL — HIGH (ref 70–99)
HCT VFR BLD CALC: 39.1 % — SIGNIFICANT CHANGE UP (ref 34.5–45)
HGB BLD-MCNC: 13 G/DL — SIGNIFICANT CHANGE UP (ref 11.5–15.5)
MCHC RBC-ENTMCNC: 30.6 PG — SIGNIFICANT CHANGE UP (ref 27–34)
MCHC RBC-ENTMCNC: 33.2 GM/DL — SIGNIFICANT CHANGE UP (ref 32–36)
MCV RBC AUTO: 92 FL — SIGNIFICANT CHANGE UP (ref 80–100)
PLATELET # BLD AUTO: 195 K/UL — SIGNIFICANT CHANGE UP (ref 150–400)
POTASSIUM SERPL-MCNC: 3.6 MMOL/L — SIGNIFICANT CHANGE UP (ref 3.5–5.3)
POTASSIUM SERPL-SCNC: 3.6 MMOL/L — SIGNIFICANT CHANGE UP (ref 3.5–5.3)
RBC # BLD: 4.25 M/UL — SIGNIFICANT CHANGE UP (ref 3.8–5.2)
RBC # FLD: 12.4 % — SIGNIFICANT CHANGE UP (ref 10.3–14.5)
SODIUM SERPL-SCNC: 134 MMOL/L — LOW (ref 135–145)
WBC # BLD: 10 K/UL — SIGNIFICANT CHANGE UP (ref 3.8–10.5)
WBC # FLD AUTO: 10 K/UL — SIGNIFICANT CHANGE UP (ref 3.8–10.5)

## 2019-06-28 PROCEDURE — 99233 SBSQ HOSP IP/OBS HIGH 50: CPT

## 2019-06-28 RX ADMIN — OXYCODONE HYDROCHLORIDE 10 MILLIGRAM(S): 5 TABLET ORAL at 04:24

## 2019-06-28 RX ADMIN — Medication 1 TABLET(S): at 11:42

## 2019-06-28 RX ADMIN — DEXTROSE MONOHYDRATE, SODIUM CHLORIDE, AND POTASSIUM CHLORIDE 75 MILLILITER(S): 50; .745; 4.5 INJECTION, SOLUTION INTRAVENOUS at 00:41

## 2019-06-28 RX ADMIN — OXYCODONE HYDROCHLORIDE 10 MILLIGRAM(S): 5 TABLET ORAL at 11:46

## 2019-06-28 RX ADMIN — ENOXAPARIN SODIUM 40 MILLIGRAM(S): 100 INJECTION SUBCUTANEOUS at 22:09

## 2019-06-28 RX ADMIN — POLYETHYLENE GLYCOL 3350 17 GRAM(S): 17 POWDER, FOR SOLUTION ORAL at 11:42

## 2019-06-28 RX ADMIN — SENNA PLUS 2 TABLET(S): 8.6 TABLET ORAL at 22:09

## 2019-06-28 RX ADMIN — Medication 100 MILLIGRAM(S): at 06:30

## 2019-06-28 RX ADMIN — Medication 100 MILLIGRAM(S): at 13:19

## 2019-06-28 RX ADMIN — OXYCODONE HYDROCHLORIDE 10 MILLIGRAM(S): 5 TABLET ORAL at 12:16

## 2019-06-28 RX ADMIN — Medication 100 MILLIGRAM(S): at 22:09

## 2019-06-28 RX ADMIN — OXYCODONE HYDROCHLORIDE 10 MILLIGRAM(S): 5 TABLET ORAL at 03:27

## 2019-06-28 NOTE — DISCHARGE NOTE PROVIDER - CARE PROVIDER_API CALL
Shashank Tiwari)  Neurological Surgery  [address] [place] [address], [address]  [place] [address]  Phone: [phone]  Fax: [phone]  Follow Up Time:

## 2019-06-28 NOTE — PROGRESS NOTE ADULT - SUBJECTIVE AND OBJECTIVE BOX
SUBJECTIVE: Patient seen and examined at bedside with family earlier today. Orthostatics improving but still dizzy on standing up    OVERNIGHT EVENTS: none     Vital Signs Last 24 Hrs  T(C): 36.8 (28 Jun 2019 14:46), Max: 37.4 (28 Jun 2019 00:57)  T(F): 98.2 (28 Jun 2019 14:46), Max: 99.3 (28 Jun 2019 00:57)  HR: 74 (28 Jun 2019 14:46) (70 - 77)  BP: 110/69 (28 Jun 2019 14:46) (110/69 - 122/64)  BP(mean): --  RR: 18 (28 Jun 2019 14:46) (18 - 18)  SpO2: 95% (28 Jun 2019 14:46) (94% - 98%)    PHYSICAL EXAM:    General: No Acute Distress     Neurological: Awake, alert oriented to person, place and time, Following Commands, PERRL, EOMI, Face Symmetrical, Speech Fluent, Moving all extremities, Muscle Strength normal in all four extremities, No Drift, Sensation to Light Touch Intact    Pulmonary: Clear to Auscultation, No Rales, No Rhonchi, No Wheezes     Cardiovascular: S1, S2, Regular Rate and Rhythm     Gastrointestinal: Soft, Nontender, Nondistended     Incision: c/d/i     LABS:                       13.0   10.0  )-----------( 195      ( 28 Jun 2019 11:48 )             39.1   06-28    134<L>  |  99  |  11  ----------------------------<  122<H>  3.6   |  26  |  0.85    Ca    8.4      28 Jun 2019 11:48    DIET: regular diet      IMAGING: no new imaging

## 2019-06-28 NOTE — PROGRESS NOTE ADULT - ASSESSMENT
59 yr old Italian speaking (poor historian) pt presents to [facility] accompanied by her daughter for a L3-5 Decompression Lumbar Laminectomy Insitu Fusion on 6/25/2019. PMH: PVD (as per daughter "had some in-office procedure on her legs by a vascular doctor") and lumbar spinal stenosis. Pt w/ progressive pain from her lumbar spine down her right leg making it difficult to ambulate which is affecting her activity of daily living. Denies chest pain or SOB and feels well otherwise. (18 Jun 2019 16:51)    PROCEDURE:  6/25 s/p L3-5 lami with in situ fusion CSF leak at L5-S1 primarily repaired  POD#3    PLAN:  -Neuro: neuro stable   -Hospitalist following for orthostatic hypotension. Now improved but still dizzy on standing up. Continue fluids for 1 more day.   D/c planning for tomorrow if improved.  -Oxy IR for pain control. Valium for muscle spasms   -Encouraged incentive spirometry   -Colace, senna for bowel regimen  -Continue oscal and multivitamin  -Regular diet   -DVT ppx: venodynes and sql  -PT: home with home PT when stable     Above discussed with Dr. Te Cleaning #[id number]    Assessment:  Please Check When Present   []  GCS  E   V  M     Heart Failure: []Acute, [] acute on chronic , []chronic  Heart Failure:  [] Diastolic (HFpEF), [] Systolic (HFrEF), []Combined (HFpEF and HFrEF), [] RHF, [] Pulm HTN, [] Other    [] CHRISTI, [] ATN, [] AIN, [] other  [] CKD1, [] CKD2, [] CKD 3, [] CKD 4, [] CKD 5, []ESRD    Encephalopathy: [] Metabolic, [] Hepatic, [] toxic, [] Neurological, [] Other    Abnormal Nurtitional Status: [] malnurtition (see nutrition note), [ ]underweight: BMI < 19, [] morbid obesity: BMI >40, [] Cachexia    [] Sepsis  [] hypovolemic shock,[] cardiogenic shock, [] hemorrhagic shock, [] neuogenic shock  [] Acute Respiratory Failure  []Cerebral edema, [] Brain compression/ herniation,   [] Functional quadriplegia  [] Acute blood loss anemia

## 2019-06-28 NOTE — DISCHARGE NOTE PROVIDER - NSDCCPCAREPLAN_GEN_ALL_CORE_FT
PRINCIPAL DISCHARGE DIAGNOSIS  Diagnosis: Spinal stenosis of lumbar region  Assessment and Plan of Treatment: 6/25 s/p L3-L5 laminectomy/fusion      SECONDARY DISCHARGE DIAGNOSES  Diagnosis: Therapeutic opioid induced constipation  Assessment and Plan of Treatment: continue laxatives while taking oxycodone    Diagnosis: Orthostatic hypotension  Assessment and Plan of Treatment: resolved

## 2019-06-28 NOTE — DISCHARGE NOTE PROVIDER - HOSPITAL COURSE
59 yr old Mongolian speaking (poor historian) pt presents to [facility] accompanied by her daughter for a L3-5 Decompression Lumbar Laminectomy Insitu Fusion on 6/25/2019. PMH: PVD (as per daughter "had some in-office procedure on her legs by a vascular doctor") and lumbar spinal stenosis. Pt w/ progressive pain from her lumbar spine down her right leg making it difficult to ambulate which is affecting her activity of daily living. Denies chest pain or SOB and feels well otherwise.         On 6/25/19  s/p L3-L5 posterior laminectomy/fusion w/CSF leak with repair. Post operative course c/b orthostatic hypotension which resolved, and opiod induced constipation. Pt was evaluated by Physical Therapy and Home PT with [facility] was recommended. On day of discharge pt was medically and neurologically stable.

## 2019-06-28 NOTE — DISCHARGE NOTE PROVIDER - NSDCFUADDINST_GEN_ALL_CORE_FT
If you experience any of the following contact Dr Tiwari or return to the hospital: fever, chills, nausea or vomiting, lethargy or change in mental status, severe pain not relieved with pain medication or any bleeding or drainage from incision. If you experience any of the following contact Dr Tiwari or return to the hospital: fever, chills, nausea or vomiting, lethargy or change in mental status, severe pain not relieved with pain medication or any bleeding or drainage from incision.  You can shower. Do not rub or scrub incision/staples. Keep incision clean and dry.   No driving until cleared by Dr Tiwari.  Do not take advil aleve or ibuprofen. You can take tylenol for pain between doses of oxycodone.

## 2019-06-28 NOTE — DISCHARGE NOTE PROVIDER - NSDCACTIVITY_GEN_ALL_CORE
Stairs allowed/Do not drive or operate machinery/Walking - Outdoors allowed/Showering allowed/Walking - Indoors allowed/No heavy lifting/straining

## 2019-06-28 NOTE — PROGRESS NOTE ADULT - SUBJECTIVE AND OBJECTIVE BOX
Patient is a 59y old  Female who presents with a chief complaint of "Here for surgery on my back" (18 Jun 2019 16:51)    HPI: Pt c/o fatigue, persistent severe back pain. Mild dizziness on standing up.     Not orthostatic today, stood up with PT this am. Translation provided by daughter at bedside.     Vital Signs Last 24 Hrs  T(C): 37.2 (28 Jun 2019 04:49), Max: 37.4 (27 Jun 2019 13:36)  T(F): 98.9 (28 Jun 2019 04:49), Max: 99.4 (27 Jun 2019 13:36)  HR: 77 (28 Jun 2019 04:49) (70 - 77)  BP: 122/64 (28 Jun 2019 04:49) (104/66 - 122/64)  BP(mean): --  RR: 18 (28 Jun 2019 04:49) (18 - 18)  SpO2: 95% (28 Jun 2019 04:49) (94% - 98%)                            13.0   10.0  )-----------( 195      ( 28 Jun 2019 11:48 )             39.1     06-28    134<L>  |  99  |  11  ----------------------------<  122<H>  3.6   |  26  |  0.85    Ca    8.4      28 Jun 2019 11:48      MEDICATIONS  (STANDING):  calcium carbonate 1250 mG  + Vitamin D (OsCal 500 + D) 1 Tablet(s) Oral daily  ceFAZolin   IVPB 2000 milliGRAM(s) IV Intermittent once  docusate sodium 100 milliGRAM(s) Oral three times a day  enoxaparin Injectable 40 milliGRAM(s) SubCutaneous at bedtime  multivitamin 1 Tablet(s) Oral daily  polyethylene glycol 3350 17 Gram(s) Oral daily  senna 2 Tablet(s) Oral at bedtime  sodium chloride 0.9% with potassium chloride 20 mEq/L 1000 milliLiter(s) (75 mL/Hr) IV Continuous <Continuous>    MEDICATIONS  (PRN):  acetaminophen   Tablet .. 650 milliGRAM(s) Oral every 6 hours PRN Temp greater or equal to 38C (100.4F), Mild Pain (1 - 3)  bisacodyl Suppository 10 milliGRAM(s) Rectal daily PRN Constipation  diazepam    Tablet 5 milliGRAM(s) Oral every 8 hours PRN spasm  oxyCODONE    IR 5 milliGRAM(s) Oral every 4 hours PRN Moderate Pain (4 - 6)  oxyCODONE    IR 10 milliGRAM(s) Oral every 4 hours PRN Severe Pain (7 - 10)

## 2019-06-28 NOTE — PROGRESS NOTE ADULT - ASSESSMENT
59 yr old Malian speaking pt s/p L3-5 lami with in situ fusion on 6/25, CSF leak at L5-S1 primarily repaired.     Orthostatic post-op, now improved.     1. Orthostatic hypotension- Now improved but still dizzy on standing up. Continue fluids for 1 more day.     2. Retention of urine- Resolved.    3. Post-op pain- C/o persistent severe pain. Continue pain control.     D/c planning for tomorrow if improved.

## 2019-06-29 PROCEDURE — 99232 SBSQ HOSP IP/OBS MODERATE 35: CPT

## 2019-06-29 RX ADMIN — Medication 100 MILLIGRAM(S): at 21:33

## 2019-06-29 RX ADMIN — OXYCODONE HYDROCHLORIDE 10 MILLIGRAM(S): 5 TABLET ORAL at 22:03

## 2019-06-29 RX ADMIN — OXYCODONE HYDROCHLORIDE 10 MILLIGRAM(S): 5 TABLET ORAL at 04:01

## 2019-06-29 RX ADMIN — SENNA PLUS 2 TABLET(S): 8.6 TABLET ORAL at 21:33

## 2019-06-29 RX ADMIN — OXYCODONE HYDROCHLORIDE 10 MILLIGRAM(S): 5 TABLET ORAL at 08:37

## 2019-06-29 RX ADMIN — OXYCODONE HYDROCHLORIDE 10 MILLIGRAM(S): 5 TABLET ORAL at 08:07

## 2019-06-29 RX ADMIN — Medication 1 TABLET(S): at 11:17

## 2019-06-29 RX ADMIN — Medication 100 MILLIGRAM(S): at 13:27

## 2019-06-29 RX ADMIN — ENOXAPARIN SODIUM 40 MILLIGRAM(S): 100 INJECTION SUBCUTANEOUS at 21:33

## 2019-06-29 RX ADMIN — Medication 100 MILLIGRAM(S): at 05:19

## 2019-06-29 RX ADMIN — POLYETHYLENE GLYCOL 3350 17 GRAM(S): 17 POWDER, FOR SOLUTION ORAL at 11:17

## 2019-06-29 RX ADMIN — Medication 1 TABLET(S): at 11:16

## 2019-06-29 RX ADMIN — OXYCODONE HYDROCHLORIDE 10 MILLIGRAM(S): 5 TABLET ORAL at 21:33

## 2019-06-29 RX ADMIN — DEXTROSE MONOHYDRATE, SODIUM CHLORIDE, AND POTASSIUM CHLORIDE 75 MILLILITER(S): 50; .745; 4.5 INJECTION, SOLUTION INTRAVENOUS at 01:53

## 2019-06-29 RX ADMIN — OXYCODONE HYDROCHLORIDE 10 MILLIGRAM(S): 5 TABLET ORAL at 03:31

## 2019-06-29 NOTE — PROGRESS NOTE ADULT - ASSESSMENT
59 yr old Mauritian speaking (poor historian) pt presents to [facility] accompanied by her daughter for a L3-5 Decompression Lumbar Laminectomy Insitu Fusion on 6/25/2019. PMH: PVD (as per daughter "had some in-office procedure on her legs by a vascular doctor") and lumbar spinal stenosis. Pt w/ progressive pain from her lumbar spine down her right leg making it difficult to ambulate which is affecting her activity of daily living. Denies chest pain or SOB and feels well otherwise. (18 Jun 2019 16:51)    PROCEDURE:  6/25 s/p L3-5 lami with in situ fusion CSF leak at L5-S1 primarily repaired  POD#4    PLAN:  -Neuro: neuro stable   -Hospitalist following for orthostatic hypotension. Now improved. Continue fluids for 1 more day.   - patient doesn't feel well enough going home today, possible dc tomorrow if improved.  -Oxy IR for pain control. Valium for muscle spasms   -Encouraged incentive spirometry   -Colace, senna for bowel regimen  -Continue oscal and multivitamin  -Regular diet   -DVT ppx: venodynes and sql  -PT: home with home PT when stable     Above discussed with Dr. Tiwari      Assessment:  Please Check When Present   []  GCS  E   V  M     Heart Failure: []Acute, [] acute on chronic , []chronic  Heart Failure:  [] Diastolic (HFpEF), [] Systolic (HFrEF), []Combined (HFpEF and HFrEF), [] RHF, [] Pulm HTN, [] Other    [] CHRISTI, [] ATN, [] AIN, [] other  [] CKD1, [] CKD2, [] CKD 3, [] CKD 4, [] CKD 5, []ESRD    Encephalopathy: [] Metabolic, [] Hepatic, [] toxic, [] Neurological, [] Other    Abnormal Nurtitional Status: [] malnurtition (see nutrition note), [ ]underweight: BMI < 19, [] morbid obesity: BMI >40, [] Cachexia    [] Sepsis  [] hypovolemic shock,[] cardiogenic shock, [] hemorrhagic shock, [] neuogenic shock  [] Acute Respiratory Failure  []Cerebral edema, [] Brain compression/ herniation,   [] Functional quadriplegia  [] Acute blood loss anemia

## 2019-06-29 NOTE — PROGRESS NOTE ADULT - SUBJECTIVE AND OBJECTIVE BOX
Patient has no complaints today. Overall, feels well.    GENERAL: No fevers, no chills.  EYES: No blurry vision,  No photophobia  ENT: No sore throat.  No dysphagia  Cardiovascular: No chest pain, palpitations, orthopnea  Pulmonary: No cough, no wheezing. No shortness of breath  Gastrointestinal: No abdominal pain, no diarrhea, no constipation.   Musculoskeletal: No weakness.  No myalgias.  Dermatology:  No rashes.  Neuro: No Headache.  No vertigo.  No dizziness.  Psych: No anxiety, no depression.  Denies suicidal thoughts.    Vital Signs Last 24 Hrs  T(C): 36.8 (29 Jun 2019 13:31), Max: 37.7 (28 Jun 2019 16:04)  T(F): 98.3 (29 Jun 2019 13:31), Max: 99.9 (28 Jun 2019 16:04)  HR: 72 (29 Jun 2019 13:31) (58 - 75)  BP: 108/65 (29 Jun 2019 13:31) (100/64 - 130/74)  BP(mean): --  RR: 16 (29 Jun 2019 13:31) (16 - 18)  SpO2: 93% (29 Jun 2019 13:31) (93% - 96%)    GENERAL: NAD, well-developed  HEAD:  Atraumatic, Normocephalic  EYES: EOMI, PERRLA, conjunctiva and sclera clear  ENT: Pharynx not erythematous  PULMONARY: Clear to auscultation bilaterally; No wheeze  CARDIOVASCULAR: Regular rate and rhythm; No murmurs, rubs, or gallops  ABDOMEN: Soft, Nontender, Nondistended; Bowel sounds present  EXTREMITIES:  2+ Peripheral Pulses, No clubbing, cyanosis, or edema  MUSCULOSKELETAL: No calf tenderness  PSYCH: AAOx3, normal affect  SKIN: warm and dry, No rashes or lesions

## 2019-06-29 NOTE — PROGRESS NOTE ADULT - ASSESSMENT
59 yr old British Virgin Islander speaking pt s/p L3-5 laminectomy with in situ fusion on 6/25, CSF leak at L5-S1 primarily repaired.     Orthostatic post-op, now improved.     1. Orthostatic hypotension- c/w IVF, repeat orthostatics pending    2. Post-op pain- pain improving; pain management per primary team    disposition: dc planning per primary team    discussed with neurosurgical SWATI Garcia [id number]

## 2019-06-29 NOTE — PROGRESS NOTE ADULT - SUBJECTIVE AND OBJECTIVE BOX
SUBJECTIVE: Patient seen and examined at bedside with family earlier today. Pain improving but still severe at times.    OVERNIGHT EVENTS: none     Vital Signs Last 24 Hrs  T(C): 37.1 (29 Jun 2019 08:40), Max: 37.7 (28 Jun 2019 16:04)  T(F): 98.7 (29 Jun 2019 08:40), Max: 99.9 (28 Jun 2019 16:04)  HR: 58 (29 Jun 2019 08:40) (58 - 75)  BP: 110/74 (29 Jun 2019 08:40) (100/64 - 130/74)  BP(mean): --  RR: 16 (29 Jun 2019 08:40) (16 - 18)  SpO2: 96% (29 Jun 2019 08:40) (94% - 96%)    PHYSICAL EXAM:    General: No Acute Distress     Neurological: Awake, alert oriented to person, place and time, Following Commands, PERRL, EOMI, Face Symmetrical, Speech Fluent, Moving all extremities, Muscle Strength normal in all four extremities, No Drift, Sensation to Light Touch Intact    Pulmonary: Clear to Auscultation, No Rales, No Rhonchi, No Wheezes     Cardiovascular: S1, S2, Regular Rate and Rhythm     Gastrointestinal: Soft, Nontender, Nondistended     Incision: c/d/i     LABS:                                  13.0   10.0  )-----------( 195      ( 28 Jun 2019 11:48 )             39.1   06-28    134<L>  |  99  |  11  ----------------------------<  122<H>  3.6   |  26  |  0.85    Ca    8.4      28 Jun 2019 11:48        DIET: regular diet      IMAGING: no new imaging

## 2019-06-30 ENCOUNTER — TRANSCRIPTION ENCOUNTER (OUTPATIENT)
Age: 59
End: 2019-06-30

## 2019-06-30 VITALS
DIASTOLIC BLOOD PRESSURE: 59 MMHG | SYSTOLIC BLOOD PRESSURE: 110 MMHG | HEART RATE: 69 BPM | RESPIRATION RATE: 16 BRPM | TEMPERATURE: 99 F | OXYGEN SATURATION: 98 %

## 2019-06-30 DIAGNOSIS — I95.1 ORTHOSTATIC HYPOTENSION: ICD-10-CM

## 2019-06-30 PROCEDURE — C1889: CPT

## 2019-06-30 PROCEDURE — 72020 X-RAY EXAM OF SPINE 1 VIEW: CPT

## 2019-06-30 PROCEDURE — 97116 GAIT TRAINING THERAPY: CPT

## 2019-06-30 PROCEDURE — 86901 BLOOD TYPING SEROLOGIC RH(D): CPT

## 2019-06-30 PROCEDURE — 80048 BASIC METABOLIC PNL TOTAL CA: CPT

## 2019-06-30 PROCEDURE — 93970 EXTREMITY STUDY: CPT

## 2019-06-30 PROCEDURE — 97530 THERAPEUTIC ACTIVITIES: CPT

## 2019-06-30 PROCEDURE — 85027 COMPLETE CBC AUTOMATED: CPT

## 2019-06-30 PROCEDURE — 86900 BLOOD TYPING SEROLOGIC ABO: CPT

## 2019-06-30 PROCEDURE — 97162 PT EVAL MOD COMPLEX 30 MIN: CPT

## 2019-06-30 RX ORDER — DOCUSATE SODIUM 100 MG
1 CAPSULE ORAL
Qty: 0 | Refills: 0 | DISCHARGE
Start: 2019-06-30

## 2019-06-30 RX ORDER — OXYCODONE HYDROCHLORIDE 5 MG/1
1 TABLET ORAL
Qty: 30 | Refills: 0
Start: 2019-06-30

## 2019-06-30 RX ORDER — POLYETHYLENE GLYCOL 3350 17 G/17G
17 POWDER, FOR SOLUTION ORAL
Qty: 0 | Refills: 0 | DISCHARGE
Start: 2019-06-30

## 2019-06-30 RX ORDER — SENNA PLUS 8.6 MG/1
2 TABLET ORAL
Qty: 0 | Refills: 0 | DISCHARGE
Start: 2019-06-30

## 2019-06-30 RX ORDER — ACETAMINOPHEN 500 MG
2 TABLET ORAL
Qty: 0 | Refills: 0 | DISCHARGE
Start: 2019-06-30

## 2019-06-30 RX ORDER — MULTIVIT WITH MIN/MFOLATE/K2 340-15/3 G
1 POWDER (GRAM) ORAL ONCE
Refills: 0 | Status: COMPLETED | OUTPATIENT
Start: 2019-06-30 | End: 2019-06-30

## 2019-06-30 RX ADMIN — POLYETHYLENE GLYCOL 3350 17 GRAM(S): 17 POWDER, FOR SOLUTION ORAL at 11:49

## 2019-06-30 RX ADMIN — OXYCODONE HYDROCHLORIDE 10 MILLIGRAM(S): 5 TABLET ORAL at 08:36

## 2019-06-30 RX ADMIN — Medication 1 TABLET(S): at 11:49

## 2019-06-30 RX ADMIN — Medication 100 MILLIGRAM(S): at 05:28

## 2019-06-30 RX ADMIN — OXYCODONE HYDROCHLORIDE 10 MILLIGRAM(S): 5 TABLET ORAL at 08:06

## 2019-06-30 RX ADMIN — Medication 1 BOTTLE: at 09:29

## 2019-06-30 NOTE — PROGRESS NOTE ADULT - ASSESSMENT
59 yr old Bulgarian speaking (poor historian) pt presents to [facility] accompanied by her daughter for a L3-5 Decompression Lumbar Laminectomy Insitu Fusion on 6/25/2019. PMH: PVD (as per daughter "had some in-office procedure on her legs by a vascular doctor") and lumbar spinal stenosis. Pt w/ progressive pain from her lumbar spine down her right leg making it difficult to ambulate which is affecting her activity of daily living. Denies chest pain or SOB and feels well otherwise.     PROCEDURE:  6/25 s/p L3-L5 posterior jimenez  POD#    PLAN:  Neuro:   Respiratory:   CV:  Endocrine:   Heme/Onc:             DVT ppx:   Renal:   ID:   GI:    PT/OT:   Will discuss with ____    Assessment:  Please Check When Present   []  GCS  E   V  M     Heart Failure: []Acute, [] acute on chronic , []chronic  Heart Failure:  [] Diastolic (HFpEF), [] Systolic (HFrEF), []Combined (HFpEF and HFrEF), [] RHF, [] Pulm HTN, [] Other    [] CHRISTI, [] ATN, [] AIN, [] other  [] CKD1, [] CKD2, [] CKD 3, [] CKD 4, [] CKD 5, []ESRD    Encephalopathy: [] Metabolic, [] Hepatic, [] toxic, [] Neurological, [] Other    Abnormal Nurtitional Status: [] malnurtition (see nutrition note), [ ]underweight: BMI < 19, [] morbid obesity: BMI >40, [] Cachexia    [] Sepsis  [] hypovolemic shock,[] cardiogenic shock, [] hemorrhagic shock, [] neuogenic shock  [] Acute Respiratory Failure  []Cerebral edema, [] Brain compression/ herniation,   [] Functional quadriplegia  [] Acute blood loss anemia    Spectralink # [id number] 59 yr old Grenadian speaking (poor historian) pt presents to [facility] accompanied by her daughter for a L3-5 Decompression Lumbar Laminectomy Insitu Fusion on 6/25/2019. PMH: PVD (as per daughter "had some in-office procedure on her legs by a vascular doctor") and lumbar spinal stenosis. Pt w/ progressive pain from her lumbar spine down her right leg making it difficult to ambulate which is affecting her activity of daily living. Denies chest pain or SOB and feels well otherwise.     PROCEDURE:  6/25 s/p L3-L5 posterior laminectomy/fusion w/CSF leak  POD#5    PLAN:  Neuro:   - orthostatic hypotension is much improved  - pain control- continue oxycodone prn  - constipation- no bm in 5 days on stool softeners ans miralax, will give mag citrate and if no bm will give enema  Respiratory:   - encouraged incentive spirometry  CV:  - vital signs stable   DVT ppx:   - venodynes, sq lovenox  PT/OT:   Home PT w/[facility]- anticipate dc home today    Assessment:  Please Check When Present   []  GCS  E   V  M     Heart Failure: []Acute, [] acute on chronic , []chronic  Heart Failure:  [] Diastolic (HFpEF), [] Systolic (HFrEF), []Combined (HFpEF and HFrEF), [] RHF, [] Pulm HTN, [] Other    [] CHRISTI, [] ATN, [] AIN, [] other  [] CKD1, [] CKD2, [] CKD 3, [] CKD 4, [] CKD 5, []ESRD    Encephalopathy: [] Metabolic, [] Hepatic, [] toxic, [] Neurological, [] Other    Abnormal Nurtitional Status: [] malnurtition (see nutrition note), [ ]underweight: BMI < 19, [] morbid obesity: BMI >40, [] Cachexia    [] Sepsis  [] hypovolemic shock,[] cardiogenic shock, [] hemorrhagic shock, [] neuogenic shock  [] Acute Respiratory Failure  []Cerebral edema, [] Brain compression/ herniation,   [] Functional quadriplegia  [] Acute blood loss anemia    [employer] # [id number]

## 2019-06-30 NOTE — DISCHARGE NOTE NURSING/CASE MANAGEMENT/SOCIAL WORK - NSDCDPATPORTLINK_GEN_ALL_CORE
You can access the [facility] Portal, offered by [facility], by registering with the following website: [email or web address]

## 2019-06-30 NOTE — PROGRESS NOTE ADULT - SUBJECTIVE AND OBJECTIVE BOX
SUBJECTIVE: Pt seen and examined, still having low back pain, she was tearful this morning. Pt has not had bm in 5 days, orthostatic hypotension has improved. Daughter at bedside, pt prefers daughter to translate. Agreeable to discharge home today.    OVERNIGHT EVENTS: none    Vital Signs Last 24 Hrs  T(C): 37.3 (30 Jun 2019 08:30), Max: 37.6 (29 Jun 2019 20:27)  T(F): 99.2 (30 Jun 2019 08:30), Max: 99.6 (29 Jun 2019 20:27)  HR: 59 (30 Jun 2019 08:30) (59 - 84)  BP: 109/73 (30 Jun 2019 08:30) (101/63 - 120/75)  BP(mean): --  RR: 18 (30 Jun 2019 08:30) (16 - 18)  SpO2: 97% (30 Jun 2019 08:30) (93% - 99%)    PHYSICAL EXAM:    General: No Acute Distress     Neurological: Awake, alert oriented to person, place and time, Following Commands,  Speech Fluent, Moving all extremities, Muscle Strength normal in all four extremities, No Drift, Sensation to Light Touch Intact    Pulmonary: Clear to Auscultation, No Rales, No Rhonchi, No Wheezes     Cardiovascular: S1, S2, Regular Rate and Rhythm     Gastrointestinal: Soft, Nontender, Nondistended     Incision: low back staples c/d/i, no collection    LABS:                        13.0   10.0  )-----------( 195      ( 28 Jun 2019 11:48 )             39.1    06-28    134<L>  |  99  |  11  ----------------------------<  122<H>  3.6   |  26  |  0.85    Ca    8.4      28 Jun 2019 11:48          06-29 @ 07:01  -  06-30 @ 07:00  --------------------------------------------------------  IN: 3340 mL / OUT: 1600 mL / NET: 1740 mL      DRAINS: none    MEDICATIONS:  Antibiotics:  ceFAZolin   IVPB 2000 milliGRAM(s) IV Intermittent once    Neuro:  acetaminophen   Tablet .. 650 milliGRAM(s) Oral every 6 hours PRN Temp greater or equal to 38C (100.4F), Mild Pain (1 - 3)  oxyCODONE    IR 5 milliGRAM(s) Oral every 4 hours PRN Moderate Pain (4 - 6)  oxyCODONE    IR 10 milliGRAM(s) Oral every 4 hours PRN Severe Pain (7 - 10)    Cardiac:    Pulm:    GI/:  bisacodyl Suppository 10 milliGRAM(s) Rectal daily PRN Constipation  docusate sodium 100 milliGRAM(s) Oral three times a day  polyethylene glycol 3350 17 Gram(s) Oral daily  saline laxative (FLEET) Rectal Enema 1 Enema Rectal once  senna 2 Tablet(s) Oral at bedtime    Other:   calcium carbonate 1250 mG  + Vitamin D (OsCal 500 + D) 1 Tablet(s) Oral daily  enoxaparin Injectable 40 milliGRAM(s) SubCutaneous at bedtime  multivitamin 1 Tablet(s) Oral daily  sodium chloride 0.9% with potassium chloride 20 mEq/L 1000 milliLiter(s) IV Continuous <Continuous>    DIET: [x] Regular [] CCD [] Renal [] Puree [] Dysphagia [] Tube Feeds:     IMAGING:   < from: [facility] Duplex Lower Ext Vein Scan, Bilat (06.26.19 @ 18:04) >  FINDINGS:    There is normal compressibility of the bilateral common femoral, femoral   and popliteal veins.     Doppler examination shows normal spontaneous and phasic flow.    No calf vein thrombosis is detected.    IMPRESSION:     No evidence of deep venous thrombosis in either lower extremity.    < end of copied text >

## 2019-07-02 ENCOUNTER — EMERGENCY (EMERGENCY)
Facility: HOSPITAL | Age: 59
LOS: 1 days | Discharge: HOME | End: 2019-07-02
Attending: EMERGENCY MEDICINE
Payer: COMMERCIAL

## 2019-07-02 VITALS
OXYGEN SATURATION: 99 % | RESPIRATION RATE: 20 BRPM | HEART RATE: 69 BPM | DIASTOLIC BLOOD PRESSURE: 71 MMHG | TEMPERATURE: 98 F | SYSTOLIC BLOOD PRESSURE: 119 MMHG

## 2019-07-02 DIAGNOSIS — Z87.42 PERSONAL HISTORY OF OTHER DISEASES OF THE FEMALE GENITAL TRACT: Chronic | ICD-10-CM

## 2019-07-02 DIAGNOSIS — Z98.890 OTHER SPECIFIED POSTPROCEDURAL STATES: Chronic | ICD-10-CM

## 2019-07-02 LAB
ALBUMIN SERPL ELPH-MCNC: 3.5 G/DL — SIGNIFICANT CHANGE UP (ref 3.3–5)
ALP SERPL-CCNC: 62 U/L — SIGNIFICANT CHANGE UP (ref 40–120)
ALT FLD-CCNC: 34 U/L — SIGNIFICANT CHANGE UP (ref 10–45)
ANION GAP SERPL CALC-SCNC: 10 MMOL/L — SIGNIFICANT CHANGE UP (ref 5–17)
AST SERPL-CCNC: 27 U/L — SIGNIFICANT CHANGE UP (ref 10–40)
BILIRUB SERPL-MCNC: 0.2 MG/DL — SIGNIFICANT CHANGE UP (ref 0.2–1.2)
BUN SERPL-MCNC: 10 MG/DL — SIGNIFICANT CHANGE UP (ref 7–23)
CALCIUM SERPL-MCNC: 9 MG/DL — SIGNIFICANT CHANGE UP (ref 8.4–10.5)
CHLORIDE SERPL-SCNC: 101 MMOL/L — SIGNIFICANT CHANGE UP (ref 96–108)
CO2 SERPL-SCNC: 27 MMOL/L — SIGNIFICANT CHANGE UP (ref 22–31)
CREAT SERPL-MCNC: 0.88 MG/DL — SIGNIFICANT CHANGE UP (ref 0.5–1.3)
GLUCOSE SERPL-MCNC: 139 MG/DL — HIGH (ref 70–99)
HCT VFR BLD CALC: 35.1 % — SIGNIFICANT CHANGE UP (ref 34.5–45)
HGB BLD-MCNC: 12.3 G/DL — SIGNIFICANT CHANGE UP (ref 11.5–15.5)
MCHC RBC-ENTMCNC: 32 PG — SIGNIFICANT CHANGE UP (ref 27–34)
MCHC RBC-ENTMCNC: 35 GM/DL — SIGNIFICANT CHANGE UP (ref 32–36)
MCV RBC AUTO: 91.4 FL — SIGNIFICANT CHANGE UP (ref 80–100)
PLATELET # BLD AUTO: 284 K/UL — SIGNIFICANT CHANGE UP (ref 150–400)
POTASSIUM SERPL-MCNC: 3.6 MMOL/L — SIGNIFICANT CHANGE UP (ref 3.5–5.3)
POTASSIUM SERPL-SCNC: 3.6 MMOL/L — SIGNIFICANT CHANGE UP (ref 3.5–5.3)
PROT SERPL-MCNC: 7 G/DL — SIGNIFICANT CHANGE UP (ref 6–8.3)
RBC # BLD: 3.84 M/UL — SIGNIFICANT CHANGE UP (ref 3.8–5.2)
RBC # FLD: 12 % — SIGNIFICANT CHANGE UP (ref 10.3–14.5)
SODIUM SERPL-SCNC: 138 MMOL/L — SIGNIFICANT CHANGE UP (ref 135–145)
TROPONIN T, HIGH SENSITIVITY RESULT: <6 NG/L — SIGNIFICANT CHANGE UP (ref 0–51)
WBC # BLD: 7.5 K/UL — SIGNIFICANT CHANGE UP (ref 3.8–10.5)
WBC # FLD AUTO: 7.5 K/UL — SIGNIFICANT CHANGE UP (ref 3.8–10.5)

## 2019-07-02 PROCEDURE — 93010 ELECTROCARDIOGRAM REPORT: CPT

## 2019-07-02 PROCEDURE — 99285 EMERGENCY DEPT VISIT HI MDM: CPT

## 2019-07-02 PROCEDURE — 71046 X-RAY EXAM CHEST 2 VIEWS: CPT | Mod: 26

## 2019-07-02 RX ORDER — SODIUM CHLORIDE 9 MG/ML
1000 INJECTION INTRAMUSCULAR; INTRAVENOUS; SUBCUTANEOUS ONCE
Refills: 0 | Status: COMPLETED | OUTPATIENT
Start: 2019-07-02 | End: 2019-07-02

## 2019-07-02 RX ADMIN — SODIUM CHLORIDE 1000 MILLILITER(S): 9 INJECTION INTRAMUSCULAR; INTRAVENOUS; SUBCUTANEOUS at 23:14

## 2019-07-02 NOTE — ED PROVIDER NOTE - NSFOLLOWUPINSTRUCTIONS_ED_ALL_ED_FT
-you came to the hospital because of numbness and weakness in your arms and legs. You were examine by us and neurosurgeon, you did not have cord compression. We did other tests, your heart and lung are normal, and your blood tests are normal.     -Your pain in the bottom of your feet is most likely neuropathic, please follow up with you PCP.     -please come back to the ED if you develop sudden one sided weakness, you have difficulty breathing, chest pain, fever, chills, out of proportional pain in your back.

## 2019-07-02 NOTE — ED PROVIDER NOTE - SKIN, MLM
Skin normal color for race, warm, dry and intact. No evidence of rash. lumbar spine with staples in placed, c/d/i, no d/c

## 2019-07-02 NOTE — CONSULT NOTE ADULT - ASSESSMENT
Dee Dee Fischer, 60 yo female back in er after episode of diziness/subjective decreased sensationt hroughout body that has since resolved. States that she was getting up from laying when this occurred, improved after laying down. No radiculopathy/back pain/saddle anesthesia. No falls.  Still complains of r sole of foot paresthesia (unchanged from preop).      -No acute neurosurgical intervention, care per ER/medicine for possible questionable orthostasis

## 2019-07-02 NOTE — ED ADULT NURSE NOTE - OBJECTIVE STATEMENT
60 y/o Jordanian speaking female s/p recent spinal stenosis laminectomy decompression presenting to ED s/p sudden onset generalized body weakness and numbness tonight after dinner. Patient's family member at bedside providing translation reports patient went to lay down on her bed and suddenly began to feel dizzy and weak all over her body. On arrival to ED asymptomatic. Pt denies headache, dizziness, chest pain, palpitations, cough, SOB, abdominal pain, n/v/d, urinary symptoms, fevers, chills, weakness at this time. A&Ox4 gross neuro intact, lungs cta bilaterally, no difficulty speaking in complete sentences, s1s2 heart sounds heard, pulses x 4, fish x4, abdomen soft nontender nondistended, skin intact.

## 2019-07-02 NOTE — ED ADULT NURSE NOTE - NSIMPLEMENTINTERV_GEN_ALL_ED
Implemented All Fall Risk Interventions:  [place] to call system. Call bell, personal items and telephone within reach. Instruct patient to call for assistance. Room bathroom lighting operational. Non-slip footwear when patient is off stretcher. Physically safe environment: no spills, clutter or unnecessary equipment. Stretcher in lowest position, wheels locked, appropriate side rails in place. Provide visual cue, wrist band, yellow gown, etc. Monitor gait and stability. Monitor for mental status changes and reorient to person, place, and time. Review medications for side effects contributing to fall risk. Reinforce activity limits and safety measures with patient and family.

## 2019-07-02 NOTE — ED ADULT NURSE NOTE - PSH
History of uterine prolapse  s/p sx >25 yrs ago  Status post laser ablation of incompetent vein  B/L legs 2/19'

## 2019-07-02 NOTE — ED ADULT NURSE NOTE - PMH
PVD (peripheral vascular disease)    Spinal stenosis of lumbar region    Uterine prolapse  sx >25 yrs ago

## 2019-07-02 NOTE — CONSULT NOTE ADULT - SUBJECTIVE AND OBJECTIVE BOX
p ([id number])     HPI: Dee Dee Fischer, 60 yo female back in er after episode of diziness/subjective decreased sensationt hroughout body that has since resolved. States that she was getting up from laying when this occurred, improved after laying down. No radiculopathy/back pain/saddle anesthesia. No falls.  Still complains of r sole of foot paresthesia (unchanged from preop).      PAST MEDICAL HISTORY   PVD (peripheral vascular disease)  Spinal stenosis of lumbar region  Uterine prolapse    PAST SURGICAL HISTORY   Status post laser ablation of incompetent vein  History of uterine prolapse        MEDICATIONS:  Antibiotics:    Neuro:    Anticoagulation:    Other:  sodium chloride 0.9% Bolus 1000 milliLiter(s) IV Bolus once      SOCIAL HISTORY:   Occupation:   Marital Status:     FAMILY HISTORY:  No pertinent family history in first degree relatives      REVIEW OF SYSTEMS:  Check here if all are normal other than Neurological/HPI [x]  General:  Eyes:  ENT:  Cardiac:  Respiratory:  GI:  Musculoskeletal:   Skin:  Neurologic:   Psychiatric:     PHYSICAL EXAMINATION:   T(C): 36.6 (07-02-19 @ 21:58), Max: 36.6 (07-02-19 @ 21:58)  HR: 69 (07-02-19 @ 21:58) (69 - 69)  BP: 119/71 (07-02-19 @ 21:58) (119/71 - 119/71)  RR: 20 (07-02-19 @ 21:58) (20 - 20)  SpO2: 99% (07-02-19 @ 21:58) (99% - 99%)  Wt(kg): --    General Examination:   AOx3, FC, PERRL, EOMI, no facial   5/5 throughout, no drift  SILT  no clonus  Incision CDI    LABS:                        12.3   7.5   )-----------( 284      ( 02 Jul 2019 22:44 )             35.1                 RADIOLOGY & ADDITIONAL STUDIES:

## 2019-07-02 NOTE — ED PROVIDER NOTE - OBJECTIVE STATEMENT
59F Italian speaking h/o spinal stenosis s/p L3-5 decompression laminectomy insitu fusion (6/25/19) CSF leak at L5-S1 primarily repaired, PVD who presented with sudden onset generalized body weakness and numbness. After dinner patient went to lay down on her bed, suddenly she felt weak all over her body, not localized to any particular body part. She also felt dizzy. After the laminectomy patient has been doing better. Currently her symptoms have resolved. patient denied fever, chill, nausea, vomiting, diarrhea, chest pain. She endorsed sob while she was feeling weak, that has resolved now. Patient also endorsed chronic sharp pain on the bottom of her feet b/l.

## 2019-07-03 VITALS
SYSTOLIC BLOOD PRESSURE: 107 MMHG | RESPIRATION RATE: 20 BRPM | DIASTOLIC BLOOD PRESSURE: 68 MMHG | OXYGEN SATURATION: 97 % | HEART RATE: 61 BPM | TEMPERATURE: 99 F

## 2019-07-03 PROBLEM — I73.9 PERIPHERAL VASCULAR DISEASE, UNSPECIFIED: Chronic | Status: ACTIVE | Noted: 2019-06-18

## 2019-07-03 PROBLEM — M48.061 SPINAL STENOSIS, LUMBAR REGION WITHOUT NEUROGENIC CLAUDICATION: Chronic | Status: ACTIVE | Noted: 2019-06-18

## 2019-07-03 PROBLEM — N81.4 UTEROVAGINAL PROLAPSE, UNSPECIFIED: Chronic | Status: ACTIVE | Noted: 2019-06-18

## 2019-07-03 PROCEDURE — 99284 EMERGENCY DEPT VISIT MOD MDM: CPT | Mod: 25

## 2019-07-03 PROCEDURE — 85027 COMPLETE CBC AUTOMATED: CPT

## 2019-07-03 PROCEDURE — 71046 X-RAY EXAM CHEST 2 VIEWS: CPT

## 2019-07-03 PROCEDURE — 93005 ELECTROCARDIOGRAM TRACING: CPT

## 2019-07-03 PROCEDURE — 84484 ASSAY OF TROPONIN QUANT: CPT

## 2019-07-03 PROCEDURE — 80053 COMPREHEN METABOLIC PANEL: CPT

## 2019-07-03 RX ORDER — ACETAMINOPHEN 500 MG
975 TABLET ORAL ONCE
Refills: 0 | Status: COMPLETED | OUTPATIENT
Start: 2019-07-03 | End: 2019-07-03

## 2019-07-03 RX ADMIN — Medication 975 MILLIGRAM(S): at 02:43

## 2019-07-03 NOTE — ED ADULT NURSE REASSESSMENT NOTE - NS ED NURSE REASSESS COMMENT FT1
Patient a&ox3, no acute distress, resp nonlabored, resting comfortably in bed with family at bedside.  C/o mild pain tylenol administered per order.  Denies headache, dizziness, chest pain, palpitations, SOB, abd pain, n/v/d, urinary symptoms, fevers, chills, weakness at this time. Patient awaiting discharge . Safety maintained.

## 2019-07-11 ENCOUNTER — APPOINTMENT (OUTPATIENT)
Dept: SPINE | Facility: CLINIC | Age: 59
End: 2019-07-11
Payer: COMMERCIAL

## 2019-07-11 VITALS
HEART RATE: 78 BPM | WEIGHT: 164 LBS | SYSTOLIC BLOOD PRESSURE: 110 MMHG | DIASTOLIC BLOOD PRESSURE: 71 MMHG | BODY MASS INDEX: 28 KG/M2 | TEMPERATURE: 98 F | HEIGHT: 64 IN

## 2019-07-11 PROCEDURE — 99024 POSTOP FOLLOW-UP VISIT: CPT

## 2019-07-11 RX ORDER — IBUPROFEN 200 MG/1
TABLET, COATED ORAL
Refills: 0 | Status: DISCONTINUED | COMMUNITY
End: 2019-07-11

## 2019-07-11 RX ORDER — ACETAMINOPHEN 325 MG/1
325 TABLET ORAL
Refills: 0 | Status: ACTIVE | COMMUNITY

## 2019-07-11 NOTE — REASON FOR VISIT
[Family Member] : family member [de-identified] : L3-L5 decompressive laminectomy and in situ fusion  [de-identified] : 6- [de-identified] : Preoperative claudication pain numbness is gone. She has some mild residual numbness in the right leg below the knee. She is walking a little bit more each day.

## 2019-07-11 NOTE — PHYSICAL EXAM
[Healing Well] : healing well [No Drainage] : without drainage [Motor Strength] : muscle strength was normal in all four extremities [Tender] : not tender [FreeTextEntry8] : using a walker

## 2019-07-23 ENCOUNTER — EMERGENCY (EMERGENCY)
Facility: HOSPITAL | Age: 59
LOS: 1 days | Discharge: HOME | End: 2019-07-23
Attending: EMERGENCY MEDICINE
Payer: COMMERCIAL

## 2019-07-23 VITALS
SYSTOLIC BLOOD PRESSURE: 129 MMHG | HEART RATE: 66 BPM | DIASTOLIC BLOOD PRESSURE: 78 MMHG | TEMPERATURE: 98 F | RESPIRATION RATE: 16 BRPM | WEIGHT: 169.98 LBS | HEIGHT: 64 IN | OXYGEN SATURATION: 99 %

## 2019-07-23 VITALS
DIASTOLIC BLOOD PRESSURE: 83 MMHG | TEMPERATURE: 97 F | RESPIRATION RATE: 16 BRPM | OXYGEN SATURATION: 99 % | SYSTOLIC BLOOD PRESSURE: 135 MMHG | HEART RATE: 55 BPM

## 2019-07-23 DIAGNOSIS — Z87.42 PERSONAL HISTORY OF OTHER DISEASES OF THE FEMALE GENITAL TRACT: Chronic | ICD-10-CM

## 2019-07-23 DIAGNOSIS — Z98.890 OTHER SPECIFIED POSTPROCEDURAL STATES: Chronic | ICD-10-CM

## 2019-07-23 LAB
ALBUMIN SERPL ELPH-MCNC: 4.3 G/DL — SIGNIFICANT CHANGE UP (ref 3.3–5)
ALP SERPL-CCNC: 79 U/L — SIGNIFICANT CHANGE UP (ref 40–120)
ALT FLD-CCNC: 37 U/L — SIGNIFICANT CHANGE UP (ref 10–45)
ANION GAP SERPL CALC-SCNC: 12 MMOL/L — SIGNIFICANT CHANGE UP (ref 5–17)
APTT BLD: 28.3 SEC — SIGNIFICANT CHANGE UP (ref 27.5–36.3)
AST SERPL-CCNC: 29 U/L — SIGNIFICANT CHANGE UP (ref 10–40)
BASOPHILS # BLD AUTO: 0 K/UL — SIGNIFICANT CHANGE UP (ref 0–0.2)
BASOPHILS NFR BLD AUTO: 0.7 % — SIGNIFICANT CHANGE UP (ref 0–2)
BILIRUB SERPL-MCNC: 0.1 MG/DL — LOW (ref 0.2–1.2)
BUN SERPL-MCNC: 15 MG/DL — SIGNIFICANT CHANGE UP (ref 7–23)
CALCIUM SERPL-MCNC: 9.5 MG/DL — SIGNIFICANT CHANGE UP (ref 8.4–10.5)
CHLORIDE SERPL-SCNC: 100 MMOL/L — SIGNIFICANT CHANGE UP (ref 96–108)
CO2 SERPL-SCNC: 27 MMOL/L — SIGNIFICANT CHANGE UP (ref 22–31)
CREAT SERPL-MCNC: 0.78 MG/DL — SIGNIFICANT CHANGE UP (ref 0.5–1.3)
EOSINOPHIL # BLD AUTO: 0.2 K/UL — SIGNIFICANT CHANGE UP (ref 0–0.5)
EOSINOPHIL NFR BLD AUTO: 3.3 % — SIGNIFICANT CHANGE UP (ref 0–6)
ERYTHROCYTE [SEDIMENTATION RATE] IN BLOOD: 16 MM/HR — SIGNIFICANT CHANGE UP (ref 0–20)
GLUCOSE SERPL-MCNC: 111 MG/DL — HIGH (ref 70–99)
HCT VFR BLD CALC: 40.1 % — SIGNIFICANT CHANGE UP (ref 34.5–45)
HGB BLD-MCNC: 13.6 G/DL — SIGNIFICANT CHANGE UP (ref 11.5–15.5)
INR BLD: 1.04 RATIO — SIGNIFICANT CHANGE UP (ref 0.88–1.16)
LYMPHOCYTES # BLD AUTO: 2.1 K/UL — SIGNIFICANT CHANGE UP (ref 1–3.3)
LYMPHOCYTES # BLD AUTO: 34 % — SIGNIFICANT CHANGE UP (ref 13–44)
MCHC RBC-ENTMCNC: 30.1 PG — SIGNIFICANT CHANGE UP (ref 27–34)
MCHC RBC-ENTMCNC: 34 GM/DL — SIGNIFICANT CHANGE UP (ref 32–36)
MCV RBC AUTO: 88.7 FL — SIGNIFICANT CHANGE UP (ref 80–100)
MONOCYTES # BLD AUTO: 0.5 K/UL — SIGNIFICANT CHANGE UP (ref 0–0.9)
MONOCYTES NFR BLD AUTO: 7.6 % — SIGNIFICANT CHANGE UP (ref 2–14)
NEUTROPHILS # BLD AUTO: 3.3 K/UL — SIGNIFICANT CHANGE UP (ref 1.8–7.4)
NEUTROPHILS NFR BLD AUTO: 54.4 % — SIGNIFICANT CHANGE UP (ref 43–77)
PLATELET # BLD AUTO: 245 K/UL — SIGNIFICANT CHANGE UP (ref 150–400)
POTASSIUM SERPL-MCNC: 3.9 MMOL/L — SIGNIFICANT CHANGE UP (ref 3.5–5.3)
POTASSIUM SERPL-SCNC: 3.9 MMOL/L — SIGNIFICANT CHANGE UP (ref 3.5–5.3)
PROT SERPL-MCNC: 7.5 G/DL — SIGNIFICANT CHANGE UP (ref 6–8.3)
PROTHROM AB SERPL-ACNC: 11.9 SEC — SIGNIFICANT CHANGE UP (ref 10–12.9)
RBC # BLD: 4.53 M/UL — SIGNIFICANT CHANGE UP (ref 3.8–5.2)
RBC # FLD: 12.2 % — SIGNIFICANT CHANGE UP (ref 10.3–14.5)
SODIUM SERPL-SCNC: 139 MMOL/L — SIGNIFICANT CHANGE UP (ref 135–145)
WBC # BLD: 6.1 K/UL — SIGNIFICANT CHANGE UP (ref 3.8–10.5)
WBC # FLD AUTO: 6.1 K/UL — SIGNIFICANT CHANGE UP (ref 3.8–10.5)

## 2019-07-23 PROCEDURE — 85730 THROMBOPLASTIN TIME PARTIAL: CPT

## 2019-07-23 PROCEDURE — 85027 COMPLETE CBC AUTOMATED: CPT

## 2019-07-23 PROCEDURE — 85652 RBC SED RATE AUTOMATED: CPT

## 2019-07-23 PROCEDURE — 87040 BLOOD CULTURE FOR BACTERIA: CPT

## 2019-07-23 PROCEDURE — 86140 C-REACTIVE PROTEIN: CPT

## 2019-07-23 PROCEDURE — 99284 EMERGENCY DEPT VISIT MOD MDM: CPT

## 2019-07-23 PROCEDURE — 85610 PROTHROMBIN TIME: CPT

## 2019-07-23 PROCEDURE — 80053 COMPREHEN METABOLIC PANEL: CPT

## 2019-07-23 RX ORDER — CEPHALEXIN 500 MG
1 CAPSULE ORAL
Qty: 28 | Refills: 0
Start: 2019-07-23 | End: 2019-07-29

## 2019-07-23 NOTE — ED PROVIDER NOTE - OBJECTIVE STATEMENT
59y f h/o spinal stenosis s/p L3-5 decompression laminectomy insitu fusion (6/25/19) CSF leak at L5-S1 primarily repaired, PVD, p/w laminectomy wound dehiscence and drainage. Pt states her back felt uncomfortable last night and she was unable to sleep, and today her wound care NP found the wound to be "open" and "drainage fluid". This is new, normally the wound is not draining and appears more or less closed. The patient denies outright back pain. No neuro deficits or weakness.     Spine surgeon: jeri pretty

## 2019-07-23 NOTE — ED ADULT NURSE NOTE - CHPI ED NUR SYMPTOMS NEG
no pain/no bleeding/no fever/no inflammation/no rectal pain/no chills/no bleeding at site/no purulent drainage/no redness

## 2019-07-23 NOTE — ED PROVIDER NOTE - NSFOLLOWUPINSTRUCTIONS_ED_ALL_ED_FT
Cellulitis    Cellulitis is a skin infection caused by bacteria. This condition occurs most often in the arms and lower legs but can occur anywhere over the body. Symptoms include redness, swelling, warm skin, tenderness, and chills/fever. If you were prescribed an antibiotic medicine, take it as told by your health care provider. Do not stop taking the antibiotic even if you start to feel better.    SEEK IMMEDIATE MEDICAL CARE IF YOU HAVE ANY OF THE FOLLOWING SYMPTOMS: worsening fever, red streaks coming from affected area, vomiting or diarrhea, or dizziness/lightheadedness.     IMPORTANT MESSAGE FROM YOUR DOCTOR:  Although you have been discharged from the ER, this does not mean that you have a "clean bill of health".  If your symptoms persist or get worse or if any new symptoms develop, please return to the ER immediately for re-evaluation (especially if your symptoms include chest pain, difficulty breathing, abdominal pain, fever, headache, confusion, trouble seeing, or trouble walking).  It is also very important that you see a primary care doctor within the next few days to follow-up.

## 2019-07-23 NOTE — ED ADULT NURSE NOTE - OBJECTIVE STATEMENT
58 yo f reporting to ED for wound check. PMH of Spinal Stenosis (with surgery on June 25th, 2019). Pt was seen by nurse practitioner today and said the wound was "open and possibly infected and that the pt should report to ED for evaluation". On exam, Pt AAOx3, NAD, lungs clear bilat, abdomen soft nontender nondistended, strong peripheral pulses x 4, cap refill < 2 seconds, skin warm and dry. Wound is covered by gauze. 3 inch minimally open wound with scant drainage noted to gauze. No swelling, warmth, or redness at site. Pt denies headache, dizziness, chest pain, palpitations, cough, SOB, abdominal pain, n/v/d, urinary symptoms, fevers, chills, weakness at this time. Pt ambulating here in ED. NAD. BE locked & in lowest position. Safety maintained. Will continue to reassess. 58 yo f reporting to ED for wound check. PMH of Spinal Stenosis (with surgery on June 25th, 2019). Pt was seen by nurse practitioner today and said the wound was "open and possibly infected and that the pt should report to ED for evaluation". On exam, Pt AAOx3, NAD, lungs clear bilat, abdomen soft nontender nondistended, strong peripheral pulses x 4, cap refill < 2 seconds, skin warm and dry. Incision is covered by gauze. 3 inch minimally open wound with scant drainage noted to gauze. No swelling, warmth, or redness at site. Pt denies headache, dizziness, chest pain, palpitations, cough, SOB, abdominal pain, n/v/d, urinary symptoms, fevers, chills, weakness at this time. Pt ambulating here in ED. NAD. BE locked & in lowest position. Safety maintained. Will continue to reassess.

## 2019-07-23 NOTE — ED ADULT NURSE REASSESSMENT NOTE - NS ED NURSE REASSESS COMMENT FT1
Pt AAOx4, NAD, resting comfortably in bed with family at bedside. Pt denies headache, dizziness, chest pain, cough, SOB, abdominal pain, n/v/d, urinary symptoms, fevers, chills, weakness at this time. Pt verbalized understanding to follow-up with PMD & take antibiotics as prescribed. Pt discharged as per MD, IV removed as per MD, pt ambulated independently out of ED.

## 2019-07-23 NOTE — ED ADULT TRIAGE NOTE - BSA (M2)
Patient arriving at  of office needing a dental letter in order to have dental work done. Letter generated and handed to patient while at the . 1.83

## 2019-07-23 NOTE — ED PROVIDER NOTE - ATTENDING CONTRIBUTION TO CARE
59F presenting to the ED w/ wound dehiscene and drainage, states that they noticed it today. Patient states her back felt uncomfortable last night and she was unable to sleep, and today her wound care NP found the wound to be "open" and "drainage fluid." New, has not happened before. Patient denies any pain. Denies any exacerbating/alleviating factors to drainage.     ROS:  GEN: (-) fevers/chills, (-) weight loss, (-) fatigue/malaise  HEENT: (-) visual change  NECK: (-) stiffness, (-) swelling  RESP: (-) shortness of breath, (-) cough  CV: (-) chest pain, (-) palpitations  GI: (-) nausea, (-) vomiting, (-) pain, (-) constipation, (-) diarrhea  : (-) frequency/urgency, (-) hematuria, (-) dysuria, (-) incontinence, (-) discharge  EXT: (-) edema, (-) cyanosis  NEURO: (-) paresthesias, (-) weakness, (-) headache, (-) dizziness, (-) syncope  MSK: no recent trauma, no muscle pain  SKIN: (+) wound, (+) discharge    PMHx: PVD, spinal stenosis, uterine prolapse  PSHx: Status 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  Slightly hypertensive, otherwise normal  GENERALIZED APPEARANCE:  Comfortable, no acute distress, ambulating without difficulty  SKIN:  Warm, dry, no cyanosis  HEAD:  No obvious scalp lesions  EYES:  Conjunctiva pink, no icterus  ENMT:  Mucus membranes moist, no stridor  NECK:  Supple, non-tender  CHEST AND RESPIRATORY:  Clear to auscultation B/L, good air entry B/L, equal chest expansion  HEART AND CARDIOVASCULAR:  Regular rate, no obvious murmur  ABDOMEN AND GI:  Soft, non-tender, non-distended.  No rebound, no guarding  BACK: post surgical wound with area of erythema and dehiscence 1cm with clear drainage when compressed, nontender   EXTREMITIES:  No deformity, edema, or calf tenderness  NEURO: AAOx3, gross motor and sensory intact    Impression:  S/p laminectomy with wound dehiscence, r/o infection  Labs, neurosurgery evaluation (post-op patient), re-evaluate after NSgx recs.

## 2019-07-23 NOTE — ED ADULT NURSE NOTE - NSIMPLEMENTINTERV_GEN_ALL_ED
Implemented All Universal Safety Interventions:  [place] to call system. Call bell, personal items and telephone within reach. Instruct patient to call for assistance. Room bathroom lighting operational. Non-slip footwear when patient is off stretcher. Physically safe environment: no spills, clutter or unnecessary equipment. Stretcher in lowest position, wheels locked, appropriate side rails in place.

## 2019-07-23 NOTE — ED PROVIDER NOTE - PROGRESS NOTE DETAILS
Discussed with neurosurgery resident who spoke with attending Dr. Tiwari, patient cleared for discharge, can do PO Keflex 500mg QID, patient to follow up with Dr. Tiwari in clinic.

## 2019-07-24 ENCOUNTER — OTHER (OUTPATIENT)
Age: 59
End: 2019-07-24

## 2019-07-24 LAB — CRP SERPL-MCNC: 0.19 MG/DL — SIGNIFICANT CHANGE UP (ref 0–0.4)

## 2019-07-28 LAB
CULTURE RESULTS: SIGNIFICANT CHANGE UP
SPECIMEN SOURCE: SIGNIFICANT CHANGE UP

## 2019-07-29 LAB
CULTURE RESULTS: SIGNIFICANT CHANGE UP
SPECIMEN SOURCE: SIGNIFICANT CHANGE UP

## 2019-08-01 ENCOUNTER — APPOINTMENT (OUTPATIENT)
Dept: SPINE | Facility: CLINIC | Age: 59
End: 2019-08-01
Payer: COMMERCIAL

## 2019-08-01 VITALS
WEIGHT: 170 LBS | HEART RATE: 69 BPM | HEIGHT: 64 IN | SYSTOLIC BLOOD PRESSURE: 109 MMHG | BODY MASS INDEX: 29.02 KG/M2 | DIASTOLIC BLOOD PRESSURE: 73 MMHG

## 2019-08-01 PROCEDURE — 99024 POSTOP FOLLOW-UP VISIT: CPT

## 2019-08-01 NOTE — REASON FOR VISIT
[Follow-Up: _____] : a [unfilled] follow-up visit [FreeTextEntry1] : SEE GMT. Patient reports reports significant decrease in pain to the legs, especially the right . She still reports some residual right lower leg and foot  numbness . She had a small area of dishiscence to the lumbar incision. The wound is otherwise clean, and intact, without redness. Small amount ood serous drainage daily. She is to see a wound specialist for evaluation of incision.

## 2019-08-13 ENCOUNTER — APPOINTMENT (OUTPATIENT)
Dept: WOUND CARE | Facility: CLINIC | Age: 59
End: 2019-08-13
Payer: COMMERCIAL

## 2019-08-13 PROCEDURE — 99203 OFFICE O/P NEW LOW 30 MIN: CPT

## 2019-08-13 NOTE — REVIEW OF SYSTEMS
[Feeling Poorly] : feeling poorly [Feeling Tired] : feeling tired [Constipation] : constipation [Joint Pain] : joint pain [Skin Wound] : skin wound [Negative] : Endocrine

## 2019-08-13 NOTE — ASSESSMENT
[FreeTextEntry1] : s/p wound dehisence midline lower back\par Plan - cleanse with saline, aquacel, cover with 4x4, change daily, daughter will change dressing

## 2019-08-13 NOTE — PHYSICAL EXAM
[Normal Breath Sounds] : Normal breath sounds [Normal Heart Sounds] : normal heart sounds [Ankle Swelling Bilaterally] : bilaterally  [Ankle Swelling On The Right] : mild [Alert] : alert [Oriented to Person] : oriented to person [Oriented to Place] : oriented to place [Oriented to Time] : oriented to time [Calm] : calm [JVD] : no jugular venous distention  [de-identified] : NAD [de-identified] : SOFT, + BS [de-identified] : equal, strong all 4 extremities  [de-identified] : wound dihesence [FreeTextEntry1] : back [FreeTextEntry4] : 0.3 [FreeTextEntry3] : 0.5 [FreeTextEntry2] : 1 [de-identified] : aquacel [de-identified] : dehisence

## 2019-08-29 ENCOUNTER — APPOINTMENT (OUTPATIENT)
Dept: WOUND CARE | Facility: CLINIC | Age: 59
End: 2019-08-29
Payer: COMMERCIAL

## 2019-08-29 PROCEDURE — 99213 OFFICE O/P EST LOW 20 MIN: CPT

## 2019-08-29 NOTE — REASON FOR VISIT
[Consultation] : a consultation visit [Family Member] : family member [FreeTextEntry1] : surgical wound

## 2019-08-29 NOTE — HISTORY OF PRESENT ILLNESS
[FreeTextEntry1] : Ms. CHLOE HANEY   presents to the office with a wound for 1 month duration.  The wound is located on  the lower base of spine .  The patient has complaints of pain.   The patient has been dressing the wound with dry dresssing.  The patient denies fevers or chills.  The patient has localized pain to the wound upon dressing changes.  The patient has no other complaints or associated symptoms. \par Pt. is s/p L3-L5 decompressive laminectomy from June 2019, per family initial wound healed then opened. \par \par \par

## 2019-08-29 NOTE — PHYSICAL EXAM
[Ankle Swelling Bilaterally] : bilaterally  [Ankle Swelling On The Right] : mild [Alert] : alert [Oriented to Person] : oriented to person [Oriented to Place] : oriented to place [Oriented to Time] : oriented to time [Calm] : calm [JVD] : no jugular venous distention  [de-identified] : NAD [de-identified] : limited ambulation with walker [de-identified] : not atrophic [FreeTextEntry1] : midline back  HEALED [FreeTextEntry2] : 0 [FreeTextEntry3] : 0 [FreeTextEntry4] : 0 [de-identified] : cavilon [TWNoteComboBox1] : Midline [TWNoteComboBox6] : False

## 2019-09-05 ENCOUNTER — APPOINTMENT (OUTPATIENT)
Dept: SPINE | Facility: CLINIC | Age: 59
End: 2019-09-05

## 2020-03-16 NOTE — ASSESSMENT
Procedure scheduled as Elective.   [FreeTextEntry1] : s/p wound dehiscence midline lower back\par Plan - cleanse with saline, aquacel, cover with 4x4, change daily, daughter will change dressing\par 8/28- advised use of Cavilon\par Neurosurgical f/u\par

## 2020-06-04 ENCOUNTER — APPOINTMENT (OUTPATIENT)
Dept: SPINE | Facility: CLINIC | Age: 60
End: 2020-06-04
Payer: COMMERCIAL

## 2020-06-04 PROCEDURE — 99212 OFFICE O/P EST SF 10 MIN: CPT

## 2020-06-05 VITALS
WEIGHT: 170 LBS | RESPIRATION RATE: 16 BRPM | HEIGHT: 64 IN | DIASTOLIC BLOOD PRESSURE: 81 MMHG | HEART RATE: 64 BPM | OXYGEN SATURATION: 99 % | SYSTOLIC BLOOD PRESSURE: 142 MMHG | TEMPERATURE: 98.3 F | BODY MASS INDEX: 29.02 KG/M2

## 2020-06-05 NOTE — ASSESSMENT
[FreeTextEntry1] : Ms Fischer  is one year post op  laminectomy and decompression of L3 to L5.  She has improved and able to walk long distances.  the incision is well healed .  She  may follow up on an as needed basis.

## 2020-06-05 NOTE — REASON FOR VISIT
[Follow-Up: _____] : a [unfilled] follow-up visit [Family Member] : family member [FreeTextEntry1] : 60 year old female with lumbar claudication and s/p one year follow up  L 3 to L5 laminectomy.  She has done well and her pain has diminished and she is walking longer distances.   She rojas shave numbness of her calf.  Her incision is clean and dry, well healed. \par \par :  [place] :  [place]  [id number]

## 2021-01-28 ENCOUNTER — APPOINTMENT (OUTPATIENT)
Dept: SPINE | Facility: CLINIC | Age: 61
End: 2021-01-28
Payer: COMMERCIAL

## 2021-01-28 VITALS
TEMPERATURE: 97.6 F | WEIGHT: 180 LBS | SYSTOLIC BLOOD PRESSURE: 122 MMHG | HEART RATE: 68 BPM | DIASTOLIC BLOOD PRESSURE: 75 MMHG | HEIGHT: 64 IN | BODY MASS INDEX: 30.73 KG/M2 | OXYGEN SATURATION: 96 %

## 2021-01-28 DIAGNOSIS — T81.31XD DISRUPTION OF EXTERNAL OPERATION (SURGICAL) WOUND, NOT ELSEWHERE CLASSIFIED, SUBSEQUENT ENCOUNTER: ICD-10-CM

## 2021-01-28 DIAGNOSIS — T81.30XA DISRUPTION OF WOUND, UNSPECIFIED, INITIAL ENCOUNTER: ICD-10-CM

## 2021-01-28 PROCEDURE — 99213 OFFICE O/P EST LOW 20 MIN: CPT

## 2021-01-28 PROCEDURE — 99072 ADDL SUPL MATRL&STAF TM PHE: CPT

## 2021-01-28 NOTE — PHYSICAL EXAM
[Motor Strength] : muscle strength was normal in all four extremities [Sensation Tactile Decrease] : light touch was intact [Abnormal Walk] : normal gait [2+] : Ankle jerk left 2+

## 2021-01-28 NOTE — ASSESSMENT
[FreeTextEntry1] : 60 year old female 2 years follow up L 4 L 5 decompressive laminectomy and left sided lumbar radiculopathy.   She will begin a six week regimen of PT.  An MRI of the lower spine will be prescribed.  Avoid heavy lifting.

## 2021-01-28 NOTE — REASON FOR VISIT
[Follow-Up: _____] : a [unfilled] follow-up visit [FreeTextEntry1] : 60 year old male  two years post laminectomy and decompression L 3 to L 5.  She had improved postop with resolution of  her back pain and leg pain.  She began to have 7/10 pain in her buttock area which radiates into the knee and occasional  left foot pain.   She has no trouble walking.  She has no urine or stool incontinence  No current pain medications but occasional Tylenol is effective.

## 2021-03-18 ENCOUNTER — RESULT REVIEW (OUTPATIENT)
Age: 61
End: 2021-03-18

## 2021-03-18 ENCOUNTER — APPOINTMENT (OUTPATIENT)
Dept: SPINE | Facility: CLINIC | Age: 61
End: 2021-03-18
Payer: COMMERCIAL

## 2021-03-18 VITALS
WEIGHT: 179 LBS | BODY MASS INDEX: 30.56 KG/M2 | OXYGEN SATURATION: 94 % | HEIGHT: 64 IN | SYSTOLIC BLOOD PRESSURE: 118 MMHG | DIASTOLIC BLOOD PRESSURE: 80 MMHG | HEART RATE: 76 BPM

## 2021-03-18 DIAGNOSIS — M43.16 SPONDYLOLISTHESIS, LUMBAR REGION: ICD-10-CM

## 2021-03-18 DIAGNOSIS — Z78.9 OTHER SPECIFIED HEALTH STATUS: ICD-10-CM

## 2021-03-18 DIAGNOSIS — Z82.5 FAMILY HISTORY OF ASTHMA AND OTHER CHRONIC LOWER RESPIRATORY DISEASES: ICD-10-CM

## 2021-03-18 DIAGNOSIS — Z98.890 OTHER SPECIFIED POSTPROCEDURAL STATES: ICD-10-CM

## 2021-03-18 DIAGNOSIS — M48.061 SPINAL STENOSIS, LUMBAR REGION WITHOUT NEUROGENIC CLAUDICATION: ICD-10-CM

## 2021-03-18 PROCEDURE — 99072 ADDL SUPL MATRL&STAF TM PHE: CPT

## 2021-03-18 PROCEDURE — 99213 OFFICE O/P EST LOW 20 MIN: CPT

## 2021-03-18 NOTE — REASON FOR VISIT
[Follow-Up: _____] : a [unfilled] follow-up visit [Other: _____] : [unfilled] [FreeTextEntry1] : Ms Fischer is two years post a laminectomy/decompression of L 3 to L5 with recurrent left foot pain and right sided radiculopathy.   Post op she had 90%resolution of her pain and numbness of bilateral feet.  She now has pain that is progressing and reported at  a 8/10.  Walking does improve the pain.   She has no falls and she does feel her leg may be weak due to the pain.    PT was not complete due to pain  An MRI was ordered and shows a spondylolisthesis of L 4 L 5, a new onset.   Her central stenosis has been relieved however this L4-5 spondylolisthesis was not present prior to her surgery.

## 2021-03-18 NOTE — ASSESSMENT
[FreeTextEntry1] : 60 year old female two years postop L3 to  L 5 discectomy and decompression now with right sided sided lumbar radiculopathy. and left foot numbness.   A lumbar  MRI shows spondylolisthesis at the L4- L 5 level which appears new.  She will attend PT and see a pain specialist.  A scoliosis image flexion/extension of the lumbar region and CT will be ordered as well.   In one month she will return with images and re-assessment.   She will also see a pain specialist for epidural injections and management.

## 2021-03-18 NOTE — REVIEW OF SYSTEMS
[Difficulty Walking] : difficulty walking [Negative] : Heme/Lymph [Numbness] : numbness [Tingling] : tingling [de-identified] : left leg and back pain

## 2021-03-30 ENCOUNTER — APPOINTMENT (OUTPATIENT)
Dept: RADIOLOGY | Facility: IMAGING CENTER | Age: 61
End: 2021-03-30
Payer: COMMERCIAL

## 2021-03-30 ENCOUNTER — OUTPATIENT (OUTPATIENT)
Dept: OUTPATIENT SERVICES | Facility: HOSPITAL | Age: 61
LOS: 1 days | End: 2021-03-30
Payer: COMMERCIAL

## 2021-03-30 ENCOUNTER — APPOINTMENT (OUTPATIENT)
Dept: CT IMAGING | Facility: IMAGING CENTER | Age: 61
End: 2021-03-30
Payer: COMMERCIAL

## 2021-03-30 DIAGNOSIS — Z98.890 OTHER SPECIFIED POSTPROCEDURAL STATES: Chronic | ICD-10-CM

## 2021-03-30 DIAGNOSIS — Z98.890 OTHER SPECIFIED POSTPROCEDURAL STATES: ICD-10-CM

## 2021-03-30 DIAGNOSIS — M48.061 SPINAL STENOSIS, LUMBAR REGION WITHOUT NEUROGENIC CLAUDICATION: ICD-10-CM

## 2021-03-30 DIAGNOSIS — Z00.8 ENCOUNTER FOR OTHER GENERAL EXAMINATION: ICD-10-CM

## 2021-03-30 DIAGNOSIS — Z87.42 PERSONAL HISTORY OF OTHER DISEASES OF THE FEMALE GENITAL TRACT: Chronic | ICD-10-CM

## 2021-03-30 PROCEDURE — 72131 CT LUMBAR SPINE W/O DYE: CPT | Mod: 26

## 2021-03-30 PROCEDURE — 72082 X-RAY EXAM ENTIRE SPI 2/3 VW: CPT

## 2021-03-30 PROCEDURE — 72110 X-RAY EXAM L-2 SPINE 4/>VWS: CPT

## 2021-03-30 PROCEDURE — 72084 X-RAY EXAM ENTIRE SPI 6/> VW: CPT | Mod: 26

## 2021-03-30 PROCEDURE — 72131 CT LUMBAR SPINE W/O DYE: CPT

## 2021-03-30 PROCEDURE — 72084 X-RAY EXAM ENTIRE SPI 6/> VW: CPT

## 2021-04-21 NOTE — ED ADULT NURSE NOTE - AREA
0715 Patient's siting up in bed. Bedside report received ALEXANDRA Helton RN's at the beginning of the shift.      0745 New order received and acknowledged from Dr Baer for the patient to be discharged home.    0750 Patient's sitting up in bed. Fall protocol in effect. Call light within reach. Reminded patient to call for assist. Assessment completed. No distress noted. Plan of care reviewed with the patient. Verbalzied understnading.    0800 APN of Dr Pickett visited.     0825 Dr Baer visited. POC discussed with the patient.     0915 Patient's in bed. No distress noted.    1150 Patient's in bed. No distress noted.     1255  Patient was discharged with patient's belongings, oxygen,  e prescriptions via w/c accompanied by one female CNA and significant other via Private car.   lumbar

## 2021-04-22 ENCOUNTER — APPOINTMENT (OUTPATIENT)
Dept: SPINE | Facility: CLINIC | Age: 61
End: 2021-04-22

## 2021-09-07 NOTE — H&P PST ADULT - CARDIOVASCULAR
[Well Developed] : well developed [Well Nourished] : well nourished [No Acute Distress] : no acute distress [Normal Conjunctiva] : normal conjunctiva [Normal Venous Pressure] : normal venous pressure [Normal S1, S2] : normal S1, S2 [No Murmur] : no murmur [Clear Lung Fields] : clear lung fields [Good Air Entry] : good air entry [No Respiratory Distress] : no respiratory distress  [Soft] : abdomen soft [Normal Gait] : normal gait [No Edema] : no edema [No Cyanosis] : no cyanosis [No Rash] : no rash [No Skin Lesions] : no skin lesions [Moves all extremities] : moves all extremities [Normal Speech] : normal speech [Alert and Oriented] : alert and oriented [Normal memory] : normal memory details… detailed exam

## 2022-04-11 NOTE — PHYSICAL THERAPY INITIAL EVALUATION ADULT - ASSISTIVE DEVICE:SUPINE/SIT, REHAB EVAL
Patient Name: Marcy Smith  Specialist or PCP: Isacc  Symptoms: Chest pain, leg and feet pain. Her right leg had big blister and discharge said her feet are turing darker in color. Stated she has a Dr. winnt on Thursday with Dr. Young. She is asking if she should go back to there ER or wait unitl Thursday?  Best Availability:   Callback Number: [id number]    Thank you,  Sravanthi Nuñez   bed rails

## 2023-04-24 NOTE — PHYSICAL THERAPY INITIAL EVALUATION ADULT - BALANCE TRAINING, PT EVAL
GOAL: In 2 weeks pt balance will improve by 1/2 grade. Orientation to room/Bed in low position, brakes on/Environment clear of unused equipment, furniture's in place, clear of hazards/Assess for adequate lighting, leave nightlight on/Patient and family education available to parents and patient/Document fall prevention teaching and include in plan of care

## 2024-02-13 NOTE — PATIENT PROFILE ADULT - NSPROPOAURINARYCATHETER_GEN_A_NUR
Discharge Planning Assessment  [facility]     Patient Name: Li Kimball  MRN: [id number]  Today's Date: 2/13/2024    Admit Date: 2/13/2024    Plan: Return home-lives alone   Discharge Needs Assessment       Row Name 02/13/24 0854       Living Environment    People in Home alone    Current Living Arrangements home    Potentially Unsafe Housing Conditions none    Primary Care Provided by self    Provides Primary Care For no one    Family Caregiver if Needed sibling(s)    Family Caregiver Names Brother Jose Saenz [phone]    Quality of Family Relationships helpful    Able to Return to Prior Arrangements yes       Resource/Environmental Concerns    Resource/Environmental Concerns none    Transportation Concerns none       Food Insecurity    Within the past 12 months, you worried that your food would run out before you got the money to buy more. Never true    Within the past 12 months, the food you bought just didn't last and you didn't have money to get more. Never true       Transition Planning    Patient/Family Anticipates Transition to home    Patient/Family Anticipated Services at Transition none    Transportation Anticipated family or friend will provide       Discharge Needs Assessment    Readmission Within the Last 30 Days no previous admission in last 30 days    Equipment Currently Used at Home none    Concerns to be Addressed denies needs/concerns at this time    Anticipated Changes Related to Illness none    Equipment Needed After Discharge none                   Discharge Plan       Row Name 02/13/24 0855       Plan    Plan Return home-lives alone    Patient/Family in Agreement with Plan yes    Plan Comments Spoke with patient at bedside.  She lives alone, is IADL, uses no DME, has never used HH.  PCP is Dr. Tank Shea and pharmacy is [facility].  Emergency contact is her brother Jose Saenz [phone].  Patient plans to return home at [place], does not think  she will need any help.  CCP will follow as needed.  Lizbeth PHILLIPS                  Continued Care and Services - Admitted Since 2/13/2024    Coordination has not been started for this encounter.       Expected Discharge Date and Time       Expected Discharge Date Expected Discharge Time    Feb 14, 2024            Demographic Summary       Row Name 02/13/24 0853       General Information    Admission Type observation    Arrived From home    Referral Source admission list    Reason for Consult discharge planning    Preferred Language English                   Functional Status       Row Name 02/13/24 0854       Functional Status    Usual Activity Tolerance good    Current Activity Tolerance moderate       Functional Status, IADL    Medications independent    Meal Preparation independent    Housekeeping independent    Laundry independent    Shopping independent       Mental Status    General Appearance WDL WDL       Mental Status Summary    Recent Changes in Mental Status/Cognitive Functioning no changes                              Becky S. [place], RN     no

## 2024-04-20 NOTE — H&P PST ADULT - ASSESSMENT
Admission CAPRINI SCORE [CLOT updated 18]    AGE RELATED RISK FACTORS                                                       MOBILITY RELATED FACTORS  [ ] Age 41-60 years                                            (1 Point)                    [ ] Bed rest                                                        (1 Point)  [ ] Age: 61-74 years                                           (2 Points)                  [ ] Plaster cast                                                   (2 Points)  [ ] Age= 75 years                                              (3 Points)                    [ ] Bed bound for more than 72 hours                 (2 Points)    DISEASE RELATED RISK FACTORS                                               GENDER SPECIFIC FACTORS  [ ] Edema in the lower extremities                       (1 Point)              [ ] Pregnancy                                                     (1 Point)  [ ] Varicose veins                                               (1 Point)                     [ ] Post-partum < 6 weeks                                   (1 Point)             [ ] BMI > 25 Kg/m2                                            (1 Point)                     [ ] Hormonal therapy  or oral contraception          (1 Point)                 [ ] Sepsis (in the previous month)                        (1 Point)               [ ] History of pregnancy complications                 (1 point)  [ ] Pneumonia or serious lung disease                                               [ ] Unexplained or recurrent                     (1 Point)           (in the previous month)                               (1 Point)  [ ] Abnormal pulmonary function test                     (1 Point)                 SURGERY RELATED RISK FACTORS  [ ] Acute myocardial infarction                              (1 Point)               [ ]  Section                                             (1 Point)  [ ] Congestive heart failure (in the previous month)  (1 Point)      [ ] Minor surgery                                                  (1 Point)   [ ] Inflammatory bowel disease                             (1 Point)               [ ] Arthroscopic surgery                                        (2 Points)  [ ] Central venous access                                      (2 Points)                [ ] General surgery lasting more than 45 minutes (2 points)  [ ] Present or previous malignancy                     (2 Points)                [ ] Elective arthroplasty                                         (5 points)    [ ] Stroke (in the previous month)                          (5 Points)                                                                                                                                                           HEMATOLOGY RELATED FACTORS                                                 TRAUMA RELATED RISK FACTORS  [ ] Prior episodes of VTE                                     (3 Points)                [ ] Fracture of the hip, pelvis, or leg                       (5 Points)  [ ] Positive family history for VTE                         (3 Points)             [ ] Acute spinal cord injury (in the previous month)  (5 Points)  [ ] Prothrombin 85027 A                                     (3 Points)               [ ] Paralysis  (less than 1 month)                             (5 Points)  [ ] Factor V Leiden                                             (3 Points)                  [ ] Multiple Trauma within 1 month                        (5 Points)  [ ] Lupus anticoagulants                                     (3 Points)                                                           [ ] Anticardiolipin antibodies                               (3 Points)                                                       [ ] High homocysteine in the blood                      (3 Points)                                             [ ] Other congenital or acquired thrombophilia      (3 Points)                                                [ ] Heparin induced thrombocytopenia                  (3 Points)                                     Total Score [ 5  ]

## 2024-11-14 NOTE — PATIENT PROFILE ADULT - DO YOU FEEL LIKE HURTING YOURSELF OR OTHERS?
Initial /64 (BP Location: Right arm, Patient Position: Chair, Cuff Size: Adult Large)   Pulse 78   Temp 98  F (36.7  C) (Tympanic)   Wt 81.6 kg (180 lb)   BMI 24.41 kg/m   Estimated body mass index is 24.41 kg/m  as calculated from the following:    Height as of 9/17/24: 1.829 m (6').    Weight as of this encounter: 81.6 kg (180 lb). .  Susi Sawyer LPN    
no

## 2024-11-22 ENCOUNTER — NEW PATIENT (OUTPATIENT)
Facility: LOCATION | Age: 64
End: 2024-11-22

## 2024-11-22 DIAGNOSIS — H52.4: ICD-10-CM

## 2024-11-22 DIAGNOSIS — E10.3291: ICD-10-CM

## 2024-11-22 DIAGNOSIS — D31.32: ICD-10-CM

## 2024-11-22 DIAGNOSIS — H25.13: ICD-10-CM

## 2024-11-22 DIAGNOSIS — H52.223: ICD-10-CM

## 2024-11-22 DIAGNOSIS — H52.03: ICD-10-CM

## 2024-11-22 PROCEDURE — 92015 DETERMINE REFRACTIVE STATE: CPT

## 2024-11-22 PROCEDURE — 92499OP2 OPTOMAP RETINAL SCREENING BOTH EYES

## 2024-11-22 PROCEDURE — 99204 OFFICE O/P NEW MOD 45 MIN: CPT

## 2025-02-05 ENCOUNTER — APPOINTMENT (OUTPATIENT)
Dept: INTERNAL MEDICINE | Facility: CLINIC | Age: 65
End: 2025-02-05